# Patient Record
Sex: FEMALE | Race: WHITE | Employment: UNEMPLOYED | URBAN - NONMETROPOLITAN AREA
[De-identification: names, ages, dates, MRNs, and addresses within clinical notes are randomized per-mention and may not be internally consistent; named-entity substitution may affect disease eponyms.]

---

## 2017-04-05 ENCOUNTER — HOSPITAL ENCOUNTER (OUTPATIENT)
Age: 37
Discharge: HOME | End: 2017-04-05
Payer: COMMERCIAL

## 2017-04-05 DIAGNOSIS — M79.10 MYALGIA: ICD-10-CM

## 2017-04-05 DIAGNOSIS — F32.89 OTHER DEPRESSION: ICD-10-CM

## 2017-04-05 DIAGNOSIS — R53.83 OTHER FATIGUE: ICD-10-CM

## 2017-04-05 LAB
ABSOLUTE EOS #: 0 K/UL (ref 0–0.4)
ABSOLUTE LYMPH #: 1.27 K/UL (ref 1–4.8)
ABSOLUTE MONO #: 0.91 K/UL (ref 0–1)
ALBUMIN SERPL-MCNC: 4 G/DL (ref 3.5–5.2)
ALBUMIN/GLOBULIN RATIO: 1.1 (ref 1–2.5)
ALP BLD-CCNC: 202 U/L (ref 35–104)
ALT SERPL-CCNC: 139 U/L (ref 5–33)
ANION GAP SERPL CALCULATED.3IONS-SCNC: 15 MMOL/L (ref 9–17)
AST SERPL-CCNC: 83 U/L
BASOPHILS # BLD: 0 % (ref 0–2)
BASOPHILS ABSOLUTE: 0 K/UL (ref 0–0.2)
BILIRUB SERPL-MCNC: 0.34 MG/DL (ref 0.3–1.2)
BUN BLDV-MCNC: 11 MG/DL (ref 6–20)
BUN/CREAT BLD: 21 (ref 9–20)
CALCIUM SERPL-MCNC: 9.5 MG/DL (ref 8.6–10.4)
CHLORIDE BLD-SCNC: 97 MMOL/L (ref 98–107)
CO2: 26 MMOL/L (ref 20–31)
CREAT SERPL-MCNC: 0.53 MG/DL (ref 0.5–0.9)
DIFFERENTIAL TYPE: ABNORMAL
EOSINOPHILS RELATIVE PERCENT: 0 % (ref 0–8)
GFR AFRICAN AMERICAN: >60 ML/MIN
GFR NON-AFRICAN AMERICAN: >60 ML/MIN
GFR SERPL CREATININE-BSD FRML MDRD: ABNORMAL ML/MIN/{1.73_M2}
GFR SERPL CREATININE-BSD FRML MDRD: ABNORMAL ML/MIN/{1.73_M2}
GLUCOSE BLD-MCNC: 108 MG/DL (ref 70–99)
HCT VFR BLD CALC: 39.4 % (ref 36–46)
HEMOGLOBIN: 13.1 G/DL (ref 12–16)
LYMPHOCYTES # BLD: 7 % (ref 24–44)
MCH RBC QN AUTO: 30.7 PG (ref 26–34)
MCHC RBC AUTO-ENTMCNC: 33.4 G/DL (ref 31–37)
MCV RBC AUTO: 91.9 FL (ref 80–100)
MONOCYTES # BLD: 5 % (ref 0–12)
MORPHOLOGY: NORMAL
MYOGLOBIN: <21 NG/ML (ref 25–58)
PDW BLD-RTO: 13.7 % (ref 12.1–15.2)
PLATELET # BLD: 436 K/UL (ref 140–450)
PLATELET ESTIMATE: ABNORMAL
PMV BLD AUTO: ABNORMAL FL (ref 6–12)
POTASSIUM SERPL-SCNC: 3.7 MMOL/L (ref 3.7–5.3)
RBC # BLD: 4.29 M/UL (ref 4–5.2)
RBC # BLD: ABNORMAL 10*6/UL
SEDIMENTATION RATE, ERYTHROCYTE: 59 MM (ref 0–20)
SEG NEUTROPHILS: 88 % (ref 36–66)
SEGMENTED NEUTROPHILS ABSOLUTE COUNT: 16.02 K/UL (ref 1.8–7.7)
SODIUM BLD-SCNC: 138 MMOL/L (ref 135–144)
TOTAL CK: 65 U/L (ref 26–192)
TOTAL PROTEIN: 7.7 G/DL (ref 6.4–8.3)
TSH SERPL DL<=0.05 MIU/L-ACNC: 1.79 MIU/L (ref 0.3–5)
WBC # BLD: 18.2 K/UL (ref 3.5–11)
WBC # BLD: ABNORMAL 10*3/UL

## 2017-04-05 PROCEDURE — 84443 ASSAY THYROID STIM HORMONE: CPT

## 2017-04-05 PROCEDURE — 85651 RBC SED RATE NONAUTOMATED: CPT

## 2017-04-05 PROCEDURE — 80053 COMPREHEN METABOLIC PANEL: CPT

## 2017-04-05 PROCEDURE — 85025 COMPLETE CBC W/AUTO DIFF WBC: CPT

## 2017-04-05 PROCEDURE — 82550 ASSAY OF CK (CPK): CPT

## 2017-04-05 PROCEDURE — 36415 COLL VENOUS BLD VENIPUNCTURE: CPT

## 2017-04-05 PROCEDURE — 83874 ASSAY OF MYOGLOBIN: CPT

## 2017-04-07 ENCOUNTER — HOSPITAL ENCOUNTER (OUTPATIENT)
Age: 37
Discharge: HOME | End: 2017-04-07
Payer: COMMERCIAL

## 2017-04-07 DIAGNOSIS — R79.89 LIVER FUNCTION TEST ABNORMALITY: ICD-10-CM

## 2017-04-07 LAB
ABSOLUTE EOS #: 0.1 K/UL (ref 0–0.4)
ABSOLUTE LYMPH #: 1.8 K/UL (ref 1–4.8)
ABSOLUTE MONO #: 0.9 K/UL (ref 0–1)
ALBUMIN SERPL-MCNC: 3.9 G/DL (ref 3.5–5.2)
ALBUMIN/GLOBULIN RATIO: 1.1 (ref 1–2.5)
ALP BLD-CCNC: 176 U/L (ref 35–104)
ALT SERPL-CCNC: 137 U/L (ref 5–33)
ANION GAP SERPL CALCULATED.3IONS-SCNC: 12 MMOL/L (ref 9–17)
AST SERPL-CCNC: 71 U/L
BASOPHILS # BLD: 0 % (ref 0–2)
BASOPHILS ABSOLUTE: 0 K/UL (ref 0–0.2)
BILIRUB SERPL-MCNC: 0.22 MG/DL (ref 0.3–1.2)
BUN BLDV-MCNC: 10 MG/DL (ref 6–20)
BUN/CREAT BLD: 15 (ref 9–20)
CALCIUM SERPL-MCNC: 9.2 MG/DL (ref 8.6–10.4)
CHLORIDE BLD-SCNC: 102 MMOL/L (ref 98–107)
CO2: 28 MMOL/L (ref 20–31)
CREAT SERPL-MCNC: 0.68 MG/DL (ref 0.5–0.9)
DIFFERENTIAL TYPE: ABNORMAL
EOSINOPHILS RELATIVE PERCENT: 2 % (ref 0–8)
GFR AFRICAN AMERICAN: >60 ML/MIN
GFR NON-AFRICAN AMERICAN: >60 ML/MIN
GFR SERPL CREATININE-BSD FRML MDRD: ABNORMAL ML/MIN/{1.73_M2}
GFR SERPL CREATININE-BSD FRML MDRD: ABNORMAL ML/MIN/{1.73_M2}
GLUCOSE BLD-MCNC: 110 MG/DL (ref 70–99)
HCT VFR BLD CALC: 39.2 % (ref 36–46)
HEMOGLOBIN: 12.9 G/DL (ref 12–16)
LYMPHOCYTES # BLD: 20 % (ref 24–44)
MCH RBC QN AUTO: 30.3 PG (ref 26–34)
MCHC RBC AUTO-ENTMCNC: 32.9 G/DL (ref 31–37)
MCV RBC AUTO: 91.9 FL (ref 80–100)
MONOCYTES # BLD: 10 % (ref 0–12)
PDW BLD-RTO: 13.5 % (ref 12.1–15.2)
PLATELET # BLD: 422 K/UL (ref 140–450)
PLATELET ESTIMATE: ABNORMAL
PMV BLD AUTO: ABNORMAL FL (ref 6–12)
POTASSIUM SERPL-SCNC: 3.6 MMOL/L (ref 3.7–5.3)
RBC # BLD: 4.26 M/UL (ref 4–5.2)
RBC # BLD: ABNORMAL 10*6/UL
SEG NEUTROPHILS: 68 % (ref 36–66)
SEGMENTED NEUTROPHILS ABSOLUTE COUNT: 6 K/UL (ref 1.8–7.7)
SODIUM BLD-SCNC: 142 MMOL/L (ref 135–144)
TOTAL PROTEIN: 7.5 G/DL (ref 6.4–8.3)
WBC # BLD: 8.9 K/UL (ref 3.5–11)
WBC # BLD: ABNORMAL 10*3/UL

## 2017-04-07 PROCEDURE — 36415 COLL VENOUS BLD VENIPUNCTURE: CPT

## 2017-04-07 PROCEDURE — 85025 COMPLETE CBC W/AUTO DIFF WBC: CPT

## 2017-04-07 PROCEDURE — 80053 COMPREHEN METABOLIC PANEL: CPT

## 2017-06-30 PROBLEM — E66.01 MORBID OBESITY (HCC): Status: ACTIVE | Noted: 2017-06-30

## 2017-08-08 ENCOUNTER — HOSPITAL ENCOUNTER (OUTPATIENT)
Dept: GENERAL RADIOLOGY | Age: 37
Discharge: HOME | End: 2017-08-08
Payer: COMMERCIAL

## 2017-08-08 ENCOUNTER — HOSPITAL ENCOUNTER (OUTPATIENT)
Age: 37
Discharge: HOME | End: 2017-08-08
Payer: COMMERCIAL

## 2017-08-08 DIAGNOSIS — R74.8 ABNORMAL LIVER ENZYMES: ICD-10-CM

## 2017-08-08 DIAGNOSIS — Z20.1 EXPOSURE TO TB: ICD-10-CM

## 2017-08-08 LAB
ALBUMIN SERPL-MCNC: 4.1 G/DL (ref 3.5–5.2)
ALBUMIN/GLOBULIN RATIO: 1.2 (ref 1–2.5)
ALP BLD-CCNC: 108 U/L (ref 35–104)
ALT SERPL-CCNC: 39 U/L (ref 5–33)
ANION GAP SERPL CALCULATED.3IONS-SCNC: 10 MMOL/L (ref 9–17)
AST SERPL-CCNC: 29 U/L
BILIRUB SERPL-MCNC: 0.26 MG/DL (ref 0.3–1.2)
BUN BLDV-MCNC: 9 MG/DL (ref 6–20)
BUN/CREAT BLD: 13 (ref 9–20)
CALCIUM SERPL-MCNC: 9 MG/DL (ref 8.6–10.4)
CHLORIDE BLD-SCNC: 101 MMOL/L (ref 98–107)
CO2: 27 MMOL/L (ref 20–31)
CREAT SERPL-MCNC: 0.67 MG/DL (ref 0.5–0.9)
GFR AFRICAN AMERICAN: >60 ML/MIN
GFR NON-AFRICAN AMERICAN: >60 ML/MIN
GFR SERPL CREATININE-BSD FRML MDRD: ABNORMAL ML/MIN/{1.73_M2}
GFR SERPL CREATININE-BSD FRML MDRD: ABNORMAL ML/MIN/{1.73_M2}
GLUCOSE BLD-MCNC: 106 MG/DL (ref 70–99)
POTASSIUM SERPL-SCNC: 3.7 MMOL/L (ref 3.7–5.3)
SODIUM BLD-SCNC: 138 MMOL/L (ref 135–144)
TOTAL PROTEIN: 7.4 G/DL (ref 6.4–8.3)

## 2017-08-08 PROCEDURE — 80053 COMPREHEN METABOLIC PANEL: CPT

## 2017-08-08 PROCEDURE — 36415 COLL VENOUS BLD VENIPUNCTURE: CPT

## 2017-08-08 PROCEDURE — 71020 XR CHEST STANDARD TWO VW: CPT

## 2017-08-30 ENCOUNTER — HOSPITAL ENCOUNTER (OUTPATIENT)
Age: 37
Discharge: HOME | End: 2017-08-30
Payer: COMMERCIAL

## 2017-08-30 ENCOUNTER — HOSPITAL ENCOUNTER (OUTPATIENT)
Dept: GENERAL RADIOLOGY | Age: 37
Discharge: HOME | End: 2017-08-30
Payer: COMMERCIAL

## 2017-08-30 DIAGNOSIS — S69.91XA WRIST INJURY, RIGHT, INITIAL ENCOUNTER: ICD-10-CM

## 2017-08-30 PROCEDURE — 73110 X-RAY EXAM OF WRIST: CPT

## 2017-09-21 ENCOUNTER — HOSPITAL ENCOUNTER (OUTPATIENT)
Age: 37
Discharge: HOME | End: 2017-09-21
Payer: COMMERCIAL

## 2017-09-21 DIAGNOSIS — Z20.1 EXPOSURE TO TB: ICD-10-CM

## 2017-09-21 LAB
ALT SERPL-CCNC: 29 U/L (ref 5–33)
AST SERPL-CCNC: 25 U/L

## 2017-09-21 PROCEDURE — 36415 COLL VENOUS BLD VENIPUNCTURE: CPT

## 2017-09-21 PROCEDURE — 84460 ALANINE AMINO (ALT) (SGPT): CPT

## 2017-09-21 PROCEDURE — 84450 TRANSFERASE (AST) (SGOT): CPT

## 2018-03-29 PROBLEM — F41.1 GAD (GENERALIZED ANXIETY DISORDER): Status: ACTIVE | Noted: 2018-03-29

## 2018-03-29 PROBLEM — F33.40 DEPRESSION, MAJOR, RECURRENT, IN REMISSION (HCC): Status: ACTIVE | Noted: 2018-03-29

## 2018-07-23 RX ORDER — LEVONORGESTREL AND ETHINYL ESTRADIOL AND ETHINYL ESTRADIOL 150-30(84)
KIT ORAL
Qty: 91 TABLET | Refills: 3 | Status: SHIPPED | OUTPATIENT
Start: 2018-07-23 | End: 2018-09-10 | Stop reason: SDUPTHER

## 2018-09-10 RX ORDER — LEVONORGESTREL / ETHINYL ESTRADIOL AND ETHINYL ESTRADIOL 150-30(84)
1 KIT ORAL DAILY
Qty: 91 TABLET | Refills: 1 | Status: SHIPPED | OUTPATIENT
Start: 2018-09-10 | End: 2019-08-29 | Stop reason: SDUPTHER

## 2018-11-16 PROBLEM — R51.9 HEADACHE, UNSPECIFIED HEADACHE TYPE: Status: ACTIVE | Noted: 2018-11-16

## 2019-02-15 PROBLEM — G47.69 NOCTURNAL MYOCLONUS: Status: ACTIVE | Noted: 2019-02-15

## 2019-06-19 ENCOUNTER — OFFICE VISIT (OUTPATIENT)
Dept: OBGYN | Age: 39
End: 2019-06-19
Payer: COMMERCIAL

## 2019-06-19 ENCOUNTER — HOSPITAL ENCOUNTER (OUTPATIENT)
Age: 39
Setting detail: SPECIMEN
Discharge: HOME | End: 2019-06-19
Payer: COMMERCIAL

## 2019-06-19 VITALS
BODY MASS INDEX: 38.61 KG/M2 | DIASTOLIC BLOOD PRESSURE: 80 MMHG | WEIGHT: 246 LBS | HEIGHT: 67 IN | SYSTOLIC BLOOD PRESSURE: 126 MMHG

## 2019-06-19 DIAGNOSIS — Z01.419 ENCOUNTER FOR ANNUAL ROUTINE GYNECOLOGICAL EXAMINATION: ICD-10-CM

## 2019-06-19 DIAGNOSIS — R63.5 WEIGHT GAIN: ICD-10-CM

## 2019-06-19 DIAGNOSIS — Z01.419 ENCOUNTER FOR ANNUAL ROUTINE GYNECOLOGICAL EXAMINATION: Primary | ICD-10-CM

## 2019-06-19 DIAGNOSIS — N92.6 IRREGULAR MENSES: ICD-10-CM

## 2019-06-19 DIAGNOSIS — F43.21 ADJUSTMENT DISORDER WITH DEPRESSED MOOD: ICD-10-CM

## 2019-06-19 PROCEDURE — 99396 PREV VISIT EST AGE 40-64: CPT | Performed by: ADVANCED PRACTICE MIDWIFE

## 2019-06-19 PROCEDURE — G0145 SCR C/V CYTO,THINLAYER,RESCR: HCPCS

## 2019-06-19 RX ORDER — MEDROXYPROGESTERONE ACETATE 5 MG/1
5 TABLET ORAL DAILY
Qty: 5 TABLET | Refills: 0 | Status: SHIPPED | OUTPATIENT
Start: 2019-06-19 | End: 2019-08-29

## 2019-06-19 RX ORDER — LEVONORGESTREL / ETHINYL ESTRADIOL AND ETHINYL ESTRADIOL 150-30(84)
1 KIT ORAL DAILY
Qty: 91 TABLET | Refills: 3 | Status: SHIPPED | OUTPATIENT
Start: 2019-06-19 | End: 2020-08-03

## 2019-06-19 ASSESSMENT — PATIENT HEALTH QUESTIONNAIRE - PHQ9
2. FEELING DOWN, DEPRESSED OR HOPELESS: 1
SUM OF ALL RESPONSES TO PHQ QUESTIONS 1-9: 2
SUM OF ALL RESPONSES TO PHQ QUESTIONS 1-9: 2
SUM OF ALL RESPONSES TO PHQ9 QUESTIONS 1 & 2: 2
1. LITTLE INTEREST OR PLEASURE IN DOING THINGS: 1

## 2019-06-19 NOTE — PROGRESS NOTES
YEARLY PHYSICAL    Date of service: 2019    Luke Jorge  Is a 35 y.o.   female    PT's PCP is: Lissett Merchant MD     : 1980                                             Subjective:       Patient's last menstrual period was 2019 (approximate). Are your menses regular: no    OB History    Para Term  AB Living   3 3 2 1   3   SAB TAB Ectopic Molar Multiple Live Births             1      # Outcome Date GA Lbr Conrado/2nd Weight Sex Delivery Anes PTL Lv   3 Term 02/27/15 40w0d   F Vag-Spont   VINOD   2 Term  40w0d  8 lb 13 oz (3.997 kg) F Vag-Spont      1   34w0d  4 lb (1.814 kg) F Vag-Spont         Birth Comments: cerclage        Social History     Tobacco Use   Smoking Status Never Smoker   Smokeless Tobacco Never Used        Social History     Substance and Sexual Activity   Alcohol Use Yes    Comment: socially       Family History   Problem Relation Age of Onset    Diabetes Father     Other Mother         dvt, not hereditary     Cancer Mother         carcinoid    Other Maternal Grandmother         aortic & abdominal aneurysms    Breast Cancer Maternal Grandmother     Cancer Paternal Grandmother         brain    Cancer Paternal Grandfather         leukemia       Any family history of breast or ovarian cancer:  Yes    Any family history of blood clots: Yes      Allergies: Prochlorperazine edisylate      Current Outpatient Medications:     medroxyPROGESTERone (PROVERA) 5 MG tablet, Take 1 tablet by mouth daily, Disp: 5 tablet, Rfl: 0    Levonorgest-Eth Estrad -Day 0.15-0.03 &0.01 MG TABS, Take 1 tablet by mouth daily, Disp: 91 tablet, Rfl: 3    citalopram (CELEXA) 20 MG tablet, TAKE ONE TABLET BY MOUTH ONCE DAILY, Disp: 90 tablet, Rfl: 0    DULoxetine (CYMBALTA) 60 MG extended release capsule, Take 1 capsule by mouth daily, Disp: 3 capsule, Rfl: 0    DULoxetine (CYMBALTA) 60 MG considering return to cycle control meds; c/o \"cant lose weight\"    Review of Systems   Constitutional: Negative. HENT: Negative for congestion and sore throat. Respiratory: Negative for shortness of breath. Cardiovascular: Negative for chest pain. Gastrointestinal: Negative for abdominal pain, constipation, diarrhea and nausea. Genitourinary: Negative for dysuria. Musculoskeletal: Negative for back pain. Skin: Negative for rash. Neurological: Negative for headaches. Psychiatric/Behavioral: The patient is not nervous/anxious. Objective  Lymphatic:   no lymphadenopathy  Heent:   negative   Cor: regular rate and rhythm, no murmurs              Pul:clear to auscultation bilaterally- no wheezes, rales or rhonchi, normal air movement, no respiratory distress      GI: Abdomen soft, non-tender. BS normal. No masses,  No organomegaly           Extremities: normal strength, tone, and muscle mass   Breasts: Breast:normal appearance, no masses or tenderness   Pelvic Exam: GENITAL/URINARY:  External Genitalia:  General appearance; normal, Hair distribution; normal, Lesions absent  Urethral Meatus:  Size normal, Location normal, Lesions absent, Prolapse absent  Urethra: Fullness absent, Masses absent  Bladder:  Fullness absent, Masses absent, Tenderness absent, Cystocele absent  Vagina:  General appearance normal, Estrogen effect normal, Discharge absent, Lesions absent, Pelvic support normal  Cervix:  General appearance normal, Lesions absent, Discharge absent, Tenderness absent, Enlargement absent, Nodularity absent  Uterus:  Size normal, Tenderness absent  Adenexa: Masses absent, Tenderness absent  Anus/Perineum:  Lesions absent and Masses absent                                                              Assessment and Plan          Diagnosis Orders   1. Encounter for annual routine gynecological examination  PAP SMEAR    DEBI DIGITAL SCREEN W CAD BILATERAL   2.  Irregular menses  Follicle Stimulating Hormone    Luteinizing Hormone    Prolactin    TSH without Reflex    Testosterone, Free    Insulin, Total    Glucose, Fasting    medroxyPROGESTERone (PROVERA) 5 MG tablet    Levonorgest-Eth Estrad 91-Day 0.15-0.03 &0.01 MG TABS   3. Weight gain     4. Adjustment disorder with depressed mood               I am having Angélica Landis start on medroxyPROGESTERone and Levonorgest-Eth Estrad 91-Day. I am also having her maintain her Levonorgest-Eth Estrad 91-Day, ALPRAZolam, DULoxetine, citalopram, and DULoxetine. Return in about 1 year (around 6/19/2020) for yearly. She was also counseled on her preventative health maintenance recommendations and follow-up. There are no Patient Instructions on file for this visit.     Bryanna Chopra,6/20/2019 3:06 AM

## 2019-06-20 ASSESSMENT — ENCOUNTER SYMPTOMS
ABDOMINAL PAIN: 0
NAUSEA: 0
CONSTIPATION: 0
SORE THROAT: 0
DIARRHEA: 0
BACK PAIN: 0
SHORTNESS OF BREATH: 0

## 2019-06-21 ENCOUNTER — HOSPITAL ENCOUNTER (OUTPATIENT)
Age: 39
Discharge: HOME | End: 2019-06-21
Payer: COMMERCIAL

## 2019-06-21 DIAGNOSIS — N92.6 IRREGULAR MENSES: ICD-10-CM

## 2019-06-21 LAB
FOLLICLE STIMULATING HORMONE: 3 U/L (ref 1.7–21.5)
GLUCOSE FASTING: 95 MG/DL (ref 70–99)
INSULIN COMMENT: NORMAL
INSULIN REFERENCE RANGE:: NORMAL
INSULIN: 6.8 MU/L
LH: 3.1 U/L (ref 1–95.6)
PROLACTIN: 14.53 UG/L (ref 4.79–23.3)
SEX HORMONE BINDING GLOBULIN: 101 NMOL/L (ref 30–135)
TESTOSTERONE FREE-NONMALE: 3.9 PG/ML (ref 1.3–9.2)
TESTOSTERONE TOTAL: 48 NG/DL (ref 20–70)
TSH SERPL DL<=0.05 MIU/L-ACNC: 1.5 MIU/L (ref 0.3–5)

## 2019-06-21 PROCEDURE — 84270 ASSAY OF SEX HORMONE GLOBUL: CPT

## 2019-06-21 PROCEDURE — 84403 ASSAY OF TOTAL TESTOSTERONE: CPT

## 2019-06-21 PROCEDURE — 36415 COLL VENOUS BLD VENIPUNCTURE: CPT

## 2019-06-21 PROCEDURE — 82947 ASSAY GLUCOSE BLOOD QUANT: CPT

## 2019-06-21 PROCEDURE — 83525 ASSAY OF INSULIN: CPT

## 2019-06-21 PROCEDURE — 83001 ASSAY OF GONADOTROPIN (FSH): CPT

## 2019-06-21 PROCEDURE — 84443 ASSAY THYROID STIM HORMONE: CPT

## 2019-06-21 PROCEDURE — 83002 ASSAY OF GONADOTROPIN (LH): CPT

## 2019-06-21 PROCEDURE — 84146 ASSAY OF PROLACTIN: CPT

## 2019-06-24 LAB — CYTOLOGY REPORT: NORMAL

## 2019-06-26 DIAGNOSIS — E66.9 OBESITY (BMI 30-39.9): Primary | ICD-10-CM

## 2019-07-08 ENCOUNTER — TELEPHONE (OUTPATIENT)
Dept: OBGYN | Age: 39
End: 2019-07-08

## 2019-08-27 ENCOUNTER — OFFICE VISIT (OUTPATIENT)
Dept: OBGYN | Age: 39
End: 2019-08-27
Payer: COMMERCIAL

## 2019-08-27 VITALS
HEIGHT: 67 IN | WEIGHT: 241.4 LBS | DIASTOLIC BLOOD PRESSURE: 74 MMHG | BODY MASS INDEX: 37.89 KG/M2 | SYSTOLIC BLOOD PRESSURE: 124 MMHG

## 2019-08-27 DIAGNOSIS — E66.9 OBESITY (BMI 30-39.9): Primary | ICD-10-CM

## 2019-08-27 PROCEDURE — 99213 OFFICE O/P EST LOW 20 MIN: CPT | Performed by: ADVANCED PRACTICE MIDWIFE

## 2019-08-27 NOTE — PROGRESS NOTES
PROBLEM VISIT     Date of service: 2019    Juan Peres  Is a 35 y.o.  female    PT's PCP is: Salvador Crews MD     : 1980                                             Subjective:       Patient's last menstrual period was 2019 (approximate). OB History    Para Term  AB Living   3 3 2 1   3   SAB TAB Ectopic Molar Multiple Live Births             1      # Outcome Date GA Lbr Conrado/2nd Weight Sex Delivery Anes PTL Lv   3 Term 02/27/15 40w0d   F Vag-Spont   VINOD   2 Term  40w0d  8 lb 13 oz (3.997 kg) F Vag-Spont      1   34w0d  4 lb (1.814 kg) F Vag-Spont         Birth Comments: cerclage        Social History     Tobacco Use   Smoking Status Never Smoker   Smokeless Tobacco Never Used        Social History     Substance and Sexual Activity   Alcohol Use Yes    Comment: socially       Social History     Substance and Sexual Activity   Sexual Activity Yes    Partners: Male    Birth control/protection: Pill       Allergies: Prochlorperazine edisylate    Chief Complaint   Patient presents with    Obesity     Medication/ weight check, patient currently using Saxenda. has adjusted dose up and then down . Was doing very well then had extreme hunger and was adviced by [place]  to decrease dose and then work up again. Previous weight 246 lbs. on 2019. Last Yearly:  2019    Last pap: 2019    Last HPV: na    PE:  Vital Signs  Blood pressure 124/74, height 5' 6.5\" (1.689 m), weight 241 lb 6.4 oz (109.5 kg), last menstrual period 2019, not currently breastfeeding. NURSE: Krystal ESTRADA    HPI: here for f/u weight management with Saxenda. Has continued diet and exercise; lost 5 lbs. With assistance of saxenda  did not tolerate higher doses so extended extra weeks of lower doses now has one dose of 1.8 mg left in last pen and is on 1.8 mg QD.   Desires continuation      PT denies fever, chills, nausea and vomiting       Objective: No acute distress  Excellent communications  Well-nourished    Results reviewed today:    No results found for this visit on 08/27/19. Assessment and Plan          Diagnosis Orders   1. Obesity (BMI 30-39.9)  liraglutide-weight management 18 MG/3ML SOPN       discussed case with Dr. Jose Zhu, will refill Saxenda but if unable to reach 3 mg a day does not meet continuation criteria. I am having Joey Landis start on liraglutide-weight management. I am also having her maintain her Levonorgest-Eth Estrad 91-Day, ALPRAZolam, and Liraglutide -Weight Management (SAXENDA SC). Return in about 1 month (around 9/27/2019) for med check. There are no Patient Instructions on file for this visit. Over 50% of time spent on counseling and care coordination on: see assessment and plan,  She was also counseled on her preventative health maintenance recommendations and follow-up.         FF time: 15 min      Todd Chopra,8/31/2019 1:15 PM

## 2019-10-03 ENCOUNTER — OFFICE VISIT (OUTPATIENT)
Dept: PULMONOLOGY | Age: 39
End: 2019-10-03
Payer: COMMERCIAL

## 2019-10-03 VITALS
HEIGHT: 67 IN | HEART RATE: 96 BPM | DIASTOLIC BLOOD PRESSURE: 75 MMHG | BODY MASS INDEX: 36.41 KG/M2 | TEMPERATURE: 99.4 F | RESPIRATION RATE: 16 BRPM | SYSTOLIC BLOOD PRESSURE: 129 MMHG | WEIGHT: 232 LBS

## 2019-10-03 DIAGNOSIS — G47.10 HYPERSOMNIA WITH SLEEP APNEA: ICD-10-CM

## 2019-10-03 DIAGNOSIS — F51.4 ADULT NIGHT TERRORS: ICD-10-CM

## 2019-10-03 DIAGNOSIS — E66.9 OBESITY (BMI 35.0-39.9 WITHOUT COMORBIDITY): ICD-10-CM

## 2019-10-03 DIAGNOSIS — G47.30 HYPERSOMNIA WITH SLEEP APNEA: ICD-10-CM

## 2019-10-03 DIAGNOSIS — G47.50 PARASOMNIA, UNSPECIFIED TYPE: Primary | ICD-10-CM

## 2019-10-03 DIAGNOSIS — G47.33 OBSTRUCTIVE SLEEP APNEA: ICD-10-CM

## 2019-10-03 DIAGNOSIS — G47.69 NOCTURNAL MYOCLONUS: ICD-10-CM

## 2019-10-03 PROCEDURE — 99204 OFFICE O/P NEW MOD 45 MIN: CPT | Performed by: INTERNAL MEDICINE

## 2019-11-11 ENCOUNTER — HOSPITAL ENCOUNTER (OUTPATIENT)
Dept: SLEEP CENTER | Age: 39
Discharge: HOME | End: 2019-11-11
Payer: COMMERCIAL

## 2019-11-11 DIAGNOSIS — G47.50 PARASOMNIA, UNSPECIFIED TYPE: ICD-10-CM

## 2019-11-11 DIAGNOSIS — G47.69 NOCTURNAL MYOCLONUS: ICD-10-CM

## 2019-11-11 DIAGNOSIS — G47.33 OBSTRUCTIVE SLEEP APNEA: ICD-10-CM

## 2019-11-11 PROCEDURE — 95810 POLYSOM 6/> YRS 4/> PARAM: CPT

## 2019-12-02 LAB — STATUS: NORMAL

## 2019-12-08 ENCOUNTER — HOSPITAL ENCOUNTER (OUTPATIENT)
Dept: SLEEP CENTER | Age: 39
Discharge: HOME | End: 2019-12-08
Payer: COMMERCIAL

## 2019-12-08 DIAGNOSIS — G47.50 PARASOMNIA, UNSPECIFIED TYPE: ICD-10-CM

## 2019-12-08 DIAGNOSIS — G47.33 OBSTRUCTIVE SLEEP APNEA: ICD-10-CM

## 2019-12-08 PROCEDURE — 95811 POLYSOM 6/>YRS CPAP 4/> PARM: CPT

## 2020-02-13 ENCOUNTER — OFFICE VISIT (OUTPATIENT)
Dept: PULMONOLOGY | Age: 40
End: 2020-02-13
Payer: COMMERCIAL

## 2020-02-13 VITALS
WEIGHT: 255 LBS | TEMPERATURE: 98.3 F | SYSTOLIC BLOOD PRESSURE: 118 MMHG | RESPIRATION RATE: 16 BRPM | HEIGHT: 67 IN | BODY MASS INDEX: 40.02 KG/M2 | DIASTOLIC BLOOD PRESSURE: 78 MMHG | HEART RATE: 84 BPM | OXYGEN SATURATION: 98 %

## 2020-02-13 PROCEDURE — 99214 OFFICE O/P EST MOD 30 MIN: CPT | Performed by: INTERNAL MEDICINE

## 2020-02-13 NOTE — PROGRESS NOTES
OUTPATIENT PULMONARY PROGRESS NOTE      Patient:  Taqueria Morales  MRN: [id number]    Consulting Physician: Dr. Prateek Gary  Reason for Consult: Obstructive sleep apnea/parasomnia  Primacy Care Physician: Dary Lorenzo MD    HISTORY OF PRESENT ILLNESS:   The patient is a 35 y.o. female   She was initially referred here for evaluation of hypersomnia/obstructive sleep apnea/parasomnia. She was seen for the first time last time about 3 months ago apparently she was supposed to come back in 6 weeks but apparently sleep study was not done and she is for follow-up today. According to patient she continued to have the same symptoms as before initially she was taking Klonopin 0.5 mg at night and for first few night symptoms were controlled and sleep was better  she started having symptoms again she was prescribed Klonopin 1 mg and according to patient it did not work and she stopped using Klonopin and currently not using it. She claimed she is still having nightmares while in behavior during sleep, screaming in sleep sometimes punching and kicking and acting out some time occasional falling out of the bed but no injury she claimed most of those episodes happen during early part of the night, no sleep paralysis or hallucination. She had a sleep study done on 11/11/2020 she had 26% stage I, 58% stage II 16% stage III and no REM sleep was recorded WASO was 162 minutes, AHI was 11.2 and she was diagnosed with mild obstructive sleep apnea, sleep efficiency was reduced, she had PLM index of 13/h but PLM arousal index of 1.3. She was started on CPAP of 12 cm and 30 days compliance data is available from 01/03/2020 02/01/2020 and average usage is 8 hours 29 minutes with AHI of 2.0 with good compliance.   Since she started on CPAP she claimed she did not feel a difference in her symptoms and she still have the same symptoms as mentioned above, she feels tired during the daytime, she feels sleepy feels like that she can go to tired she feels like that even after she had 8 hours of sleep she did not get enough sleep difficulty waking up in the morning, she has daytime fatigue tiredness sleepiness if circumstances permit she will doze off, naps if allowed snoring is very loud and possible witnessed apnea, she has weight gain considerable amount and last 5 years. He apparently was depressed about her sleep and concern about above condition and she had been on Cymbalta and Celexa. Sleep questionnaire  No dry mouth upon awakening. Positive fatigue and tiredness during the day. Goes to sleep at 8-9 pm, wakes up 630 am. It takes 30 minutes to fall asleep. Wakes up 2-3 times at night to go to bathroom. Takes 1 nap during the day sometime . No headache in am. No car wrecks or near wrecks because of the sleepiness. + nodding off while driving. + weight gain. Positive forgetfulness or decreased concentration . No nasal congestion or obstruction at night. No significant caffienated drinks or alcohol. No leg jerks during sleep. No restless feelings in legs at night. No numbness or burning in leg or feet. No leg aches or cramps . No loss of muscle strength when angry or laugh. No hallucination when dozing off or waking up from sleep. No paralysis upon awakening from sleep or going to sleep. Positive teeth grinding, Positive nightmares, no sleep walking. No night time panic attacks. No flowsheet data found.     ESS: 16  Sitting and reading 2  Watching TV 2  Sitting inactive in a public place (e.g a theater or a meeting) 2  As a passenger in a car for an hour without a break 3  Lying down to rest in the afternoon when circumstances permit 3  Sitting and talking to someone 1  Sitting quietly after a lunch without alcohol 3  In a car, while stopped for a few minutes in traffic 2    Past Medical History:        Diagnosis Date    Abdominal pain     Anxiety     Carpal tunnel syndrome     Depression     Herpes zoster     Low back pain Past Surgical History:        Procedure Laterality Date    ABDOMINOPLASTY  2004    BREAST REDUCTION SURGERY  2003    BREAST REDUCTION SURGERY Bilateral 2003    CHOLECYSTECTOMY, LAPAROSCOPIC      with cholangiograms    HERNIA REPAIR N/A 44/67/8084    UMBILICAL HERNIA REPAIR WITH MESH       Allergies: Allergies   Allergen Reactions    Prochlorperazine Edisylate Anaphylaxis         Home Meds:   Outpatient Encounter Medications as of 2/13/2020   Medication Sig Dispense Refill    DULoxetine (CYMBALTA) 60 MG extended release capsule Take 1 capsule by mouth daily 90 capsule 0    citalopram (CELEXA) 20 MG tablet TAKE ONE TABLET BY MOUTH ONCE DAILY 90 tablet 0    Levonorgest-Eth Estrad 91-Day 0.15-0.03 &0.01 MG TABS Take 1 tablet by mouth daily 91 tablet 3    albuterol sulfate HFA (PROAIR HFA) 108 (90 Base) MCG/ACT inhaler Inhale 2 puffs into the lungs every 6 hours as needed for Wheezing (Patient not taking: Reported on 2/13/2020) 1 Inhaler 3    clonazePAM (KLONOPIN) 1 MG tablet Take 1 tablet by mouth nightly as needed for Anxiety (noght terrors) for up to 30 days. (Patient not taking: Reported on 2/13/2020) 30 tablet 0     No facility-administered encounter medications on file as of 2/13/2020. Social History:   TOBACCO:   reports that she has never smoked. She has never used smokeless tobacco.  ETOH:   reports current alcohol use.   OCCUPATION:  Nursing navigator for physician practices    Family History:       Problem Relation Age of Onset    Diabetes Father     Other Mother         dvt, not hereditary     Cancer Mother         carcinoid    Other Maternal Grandmother         aortic & abdominal aneurysms    Breast Cancer Maternal Grandmother     Cancer Paternal Grandmother         brain    Cancer Paternal Grandfather         leukemia       Immunizations:    Immunization History   Administered Date(s) Administered    DTaP 10/18/2005    Influenza Virus Vaccine 09/27/2019         REVIEW OF SYSTEMS:  CONSTITUTIONAL:  positive for  Fatigue, negative for  fevers, chills, sweats, malaise, anorexia and weight loss  EYES:  negative for  double vision, blurred vision, dry eyes, visual disturbance, irritation, redness and icterus  HEENT:  negative for  hearing loss, tinnitus, nasal congestion, epistaxis, sore throat, hoarseness and voice change  RESPIRATORY:  negative for  dry cough, cough with sputum, dyspnea, wheezing, hemoptysis, chest pain and pleuritic pain  CARDIOVASCULAR:  negative for  chest pain, dyspnea, palpitations, orthopnea, PND, early saiety, edema, syncope  GASTROINTESTINAL:  negative for nausea, vomiting, diarrhea, constipation, abdominal pain, pruritus, abdominal distention, jaundice, dysphagia, reflux, hematemesis and hemtochezia  GENITOURINARY:  negative for frequency, dysuria, urinary incontinence, hesitancy, decreased stream and hematuria  HEMATOLOGIC/LYMPHATIC:  negative for easy bruising, bleeding, lymphadenopathy and petechiae  ALLERGIC/IMMUNOLOGIC:  negative for recurrent infections, urticaria, hay fever, angioedema and anaphylaxis  ENDOCRINE:  negative for heat intolerance, cold intolerance, tremor and change in bowel habits  MUSCULOSKELETAL:  negative for  myalgias, arthralgias, joint swelling, stiff joints, decreased range of motion and muscle weakness  NEUROLOGICAL:  negative for headaches, dizziness, seizures, memory problems, speech problems, visual disturbance, coordination problems, gait problems and tremor  BEHAVIOR/PSYCH:  positive for increased sleep, decreased energy level and fatigue and negative for poor appetite, increased appetite, decreased sleep, increased energy level, depressed mood and increased agitation  snoring, periods of not breathing, excessive daytime sleepiness, falling asleep while reading, watching television, disrupted sleep, naps        Physical Exam:    Vitals: /78 (Site: Right Upper Arm, Position: Sitting, Cuff Size: Large Adult)   Pulse 84 Temp 98.3 °F (36.8 °C) (Temporal)   Resp 16   Ht 5' 7\" (1.702 m)   Wt 255 lb (115.7 kg)   SpO2 98%   BMI 39.94 kg/m²   Last 3 weights: Wt Readings from Last 3 Encounters:   02/13/20 255 lb (115.7 kg)   10/03/19 232 lb (105.2 kg)   08/29/19 241 lb (109.3 kg)     Body mass index is 39.94 kg/m².     Physical Examination:   General appearance - alert, well appearing, and in no distress, overweight and acyanotic, in no respiratory distress  Mental status - alert, oriented to person, place, and time, normal mood, behavior, speech, dress, motor activity, and thought processes  Eyes - pupils equal and reactive, extraocular eye movements intact, sclera anicteric  Ears - right ear normal, left ear normal  Nose - normal and patent, no erythema, discharge or polyps  Mouth - mucous membranes moist, pharynx normal without lesions large tongue, Mp 2-3, small oropharynx  Neck - supple, no significant adenopathy, thick and short neck  Chest - clear to auscultation, no wheezes, rales or rhonchi, symmetric air entry, no tachypnea, retractions or cyanosis  Heart - normal rate, regular rhythm, normal S1, S2, no murmurs, rubs, clicks or gallops  Abdomen - soft, nontender, nondistended, no masses or organomegaly  Neurological - alert, oriented, normal speech, no focal findings or movement disorder noted  Extremities - peripheral pulses normal, no pedal edema, no clubbing or cyanosis  Skin - normal coloration and turgor, no rashes, no suspicious skin lesions noted       LABS:    CBC:   WBC   Date Value Ref Range Status   04/07/2017 8.9 3.5 - 11.0 k/uL Final   04/05/2017 18.2 (H) 3.5 - 11.0 k/uL Final   02/17/2015 14.0 (H) 3.5 - 11.0 k/uL Final     Hemoglobin   Date Value Ref Range Status   04/07/2017 12.9 12.0 - 16.0 g/dL Final   04/05/2017 13.1 12.0 - 16.0 g/dL Final   02/17/2015 12.5 12.0 - 16.0 g/dL Final     Platelets   Date Value Ref Range Status   04/07/2017 422 140 - 450 k/uL Final   04/05/2017 436 140 - 450 k/uL Final dreams and likely night terrors rather than myoclonus. She responded well to Klonopin initially and her symptoms are much controlled of nocturnal terrors and dreams were less with less acting out at night but according to patient it helped for only short period of time and she started having symptoms again Klonopin was increased but she still had symptoms and she stopped using it does not want to use Klonopin. Her sleep study shows mild sleep apnea, during sleep study she did not have any REM she has PLMS but no significant PLMD, she is still complaining of daytime fatigue tiredness and sleepiness despite using CPAP and she is compliant with CPAP for at least 1 month since CPAP was started. She will need M MARLENT with overnight CPAP, to determine sleep latency and pathological somnolence and or sleep onset REM to determine narcolepsy or other hypersomnia. I have discussed with patient and recommend evaluation in the multidisciplinary clinic in [facility] for sleep/neuropsychiatry evaluation to get a diagnosis as mentioned above parasomnia/REM behavior disorder/nocturnal myoclonus. Plan and recommendation:  . Multiple sleep latency test with overnight PSG with CPAP followed by [facility]  Recommend to refer to sleep and neuropsychiatry multidisciplinary sleep clinic in [facility], referral made. She is advised not to drive while she is sleepy. I have discussed with her about adequate sleep duration of 8 hours of sleep. Good sleep hygiene discussed. Also discussed with her about safe sleep environment, less sharp object and tables in her room, fall precautions, padding of corners of bed and tables. Refreshing nap for 30 minutes is advised during the daytime. Advised to continue CPAP every night. Compliance with CPAP discussed continue CPAP with nasal pillows. Continue heated modification maintain an active lifestyle.   Encourage exercise during the daytime and avoid exercise in the evening. Wt loss is recommended and discussed  Follow good sleep hygeine instructions  Given sleep hygeine instructions. Vaccinations recommended   Up to date with vaccinations from pulm perspective   Questions answered pertaining to diagnosis and management explained importance of compliance with therapy     It was my pleasure to evaluate Leontine Borne today. Please call with questions. Please note that this chart was generated using voice recognition Dragon dictation software. Although every effort was made to ensure the accuracy of this automated transcription, some errors in transcription may have occurred. Follow-up in office in 3 months.     Radha Verduzco MD             2/13/2020, 3:04 PM

## 2020-02-13 NOTE — PATIENT INSTRUCTIONS
the pressure of your CPAP. · If your nose is dry, try a humidifier. · If your nose is runny or stuffy, try decongestant medicine or a steroid nasal spray. Be safe with medicines. Read and follow all instructions on the label. Do not use the medicine longer than the label says. If these things do not help, you might try a different type of machine. Some machines have air pressure that adjusts on its own. Others have air pressures that are different when you breathe in than when you breathe out. This may reduce discomfort caused by too much pressure in your nose. Where can you learn more? Go to https://[employer]dy[employer]. org and sign in to your [employer] account. Enter V466 in the [place] box to learn more about \"Learning About CPAP for Sleep Apnea. \"     If you do not have an account, please click on the \"Sign Up Now\" link. Current as of: June 9, 2019  Content Version: 12.3  © 4541-0021 Healthwise, Incorporated. Care instructions adapted under license by [facility] ([facility]). If you have questions about a medical condition or this instruction, always ask your healthcare professional. [place] [address] any warranty or liability for your use of this information.

## 2020-03-12 ENCOUNTER — OFFICE VISIT (OUTPATIENT)
Dept: PRIMARY CARE CLINIC | Age: 40
End: 2020-03-12
Payer: COMMERCIAL

## 2020-03-12 VITALS
HEIGHT: 67 IN | BODY MASS INDEX: 40.65 KG/M2 | SYSTOLIC BLOOD PRESSURE: 132 MMHG | WEIGHT: 259 LBS | DIASTOLIC BLOOD PRESSURE: 82 MMHG | HEART RATE: 96 BPM | TEMPERATURE: 97.5 F

## 2020-03-12 LAB
INFLUENZA A ANTIBODY: NEGATIVE
INFLUENZA B ANTIBODY: NEGATIVE
S PYO AG THROAT QL: NORMAL

## 2020-03-12 PROCEDURE — 99213 OFFICE O/P EST LOW 20 MIN: CPT | Performed by: NURSE PRACTITIONER

## 2020-03-12 PROCEDURE — 87880 STREP A ASSAY W/OPTIC: CPT | Performed by: NURSE PRACTITIONER

## 2020-03-12 PROCEDURE — 87804 INFLUENZA ASSAY W/OPTIC: CPT | Performed by: NURSE PRACTITIONER

## 2020-03-12 RX ORDER — DOXYCYCLINE HYCLATE 100 MG
100 TABLET ORAL 2 TIMES DAILY
Qty: 14 TABLET | Refills: 0 | Status: SHIPPED | OUTPATIENT
Start: 2020-03-12 | End: 2020-03-19

## 2020-03-12 ASSESSMENT — ENCOUNTER SYMPTOMS
SORE THROAT: 1
EYE DISCHARGE: 0
COUGH: 0
RHINORRHEA: 1
NAUSEA: 0
WHEEZING: 0
SHORTNESS OF BREATH: 0
SINUS PAIN: 1
EYE ITCHING: 0
TROUBLE SWALLOWING: 0
SINUS PRESSURE: 1
EYE PAIN: 0
VOMITING: 0
ABDOMINAL DISTENTION: 0
DIARRHEA: 0

## 2020-03-12 NOTE — PATIENT INSTRUCTIONS
SURVEY:    You may be receiving a survey from [employer] regarding your visit today. Please complete the survey to enable us to provide the highest quality of care to you and your family. If you cannot score us a very good on any question, please call the office to discuss how we could of made your experience a very good one. Thank you. Patient Education        Upper Respiratory Infection (Cold): Care Instructions  Your Care Instructions    An upper respiratory infection, or URI, is an infection of the nose, sinuses, or throat. URIs are spread by coughs, sneezes, and direct contact. The common cold is the most frequent kind of URI. The flu and sinus infections are other kinds of URIs. Almost all URIs are caused by viruses. Antibiotics won't cure them. But you can treat most infections with home care. This may include drinking lots of fluids and taking over-the-counter pain medicine. You will probably feel better in 4 to 10 days. The doctor has checked you carefully, but problems can develop later. If you notice any problems or new symptoms, get medical treatment right away. Follow-up care is a key part of your treatment and safety. Be sure to make and go to all appointments, and call your doctor if you are having problems. It's also a good idea to know your test results and keep a list of the medicines you take. How can you care for yourself at home? · To prevent dehydration, drink plenty of fluids, enough so that your urine is light yellow or clear like water. Choose water and other caffeine-free clear liquids until you feel better. If you have kidney, heart, or liver disease and have to limit fluids, talk with your doctor before you increase the amount of fluids you drink. · Take an over-the-counter pain medicine, such as acetaminophen (Tylenol), ibuprofen (Advil, Motrin), or naproxen (Aleve). Read and follow all instructions on the label.   · Before you use cough and cold medicines, check the this information.

## 2020-03-12 NOTE — PROGRESS NOTES
[facility] PHYSICIANS  [facility] PRIMARY CARE [place] [address] [address] [facility] Kofi  Dept: [phone]  Dept Fax: [phone]    Narayan Newman is a 35 y.o. female who presentsto the [facility] in Care today for her medical conditions/complaints as noted below. Narayan Newman is c/o of URI (2 weeks ago sinus infection/finished ATB augmentin/congestion, headache, fatigued, fever low grade, never broke 100*)      HPI:     David Antoine is here today for a walk in visit. She was treated for a sinus infection and finished this yesterday, she was on this for 10 days. She reports when she was on the antibiotic she felt a little better however was still having symptoms along with a low-grade fever of . URI    This is a recurrent problem. The current episode started yesterday. The problem has been unchanged. There has been no fever. Associated symptoms include congestion, headaches, rhinorrhea, sinus pain and a sore throat. Pertinent negatives include no chest pain, coughing, diarrhea, dysuria, ear pain, nausea, neck pain, rash, vomiting or wheezing. Treatments tried: augmentin. The treatment provided mild relief. Past Medical History:   Diagnosis Date    Abdominal pain     Anxiety     Carpal tunnel syndrome     Depression     Herpes zoster     Low back pain         Current Outpatient Medications   Medication Sig Dispense Refill    doxycycline hyclate (VIBRA-TABS) 100 MG tablet Take 1 tablet by mouth 2 times daily for 7 days 14 tablet 0    desvenlafaxine succinate (PRISTIQ) 50 MG TB24 extended release tablet Take 1 tablet by mouth daily 30 tablet 0    citalopram (CELEXA) 20 MG tablet Take 1 tablet by mouth once daily 90 tablet 0    Levonorgest-Eth Estrad 91-Day 0.15-0.03 &0.01 MG TABS Take 1 tablet by mouth daily 91 tablet 3    eszopiclone (LUNESTA) 3 MG TABS Take 1 tablet by mouth nightly for 30 days.  (Patient not taking: Reported on 3/12/2020) 30 tablet 0    albuterol sulfate HFA (PROAIR HFA) 108 (90 Base) MCG/ACT inhaler Inhale 2 puffs into the lungs every 6 hours as needed for Wheezing (Patient not taking: Reported on 2/13/2020) 1 Inhaler 3     No current facility-administered medications for this visit. Allergies   Allergen Reactions    Prochlorperazine Edisylate Anaphylaxis       Subjective:      Review of Systems   Constitutional: Positive for fatigue and fever (tmax 100). Negative for activity change, appetite change and chills. HENT: Positive for congestion, postnasal drip, rhinorrhea, sinus pressure, sinus pain and sore throat. Negative for ear discharge, ear pain, mouth sores and trouble swallowing. Eyes: Negative for pain, discharge and itching. Respiratory: Negative for cough, shortness of breath and wheezing. Cardiovascular: Negative for chest pain and palpitations. Gastrointestinal: Negative for abdominal distention, diarrhea, nausea and vomiting. Genitourinary: Negative for difficulty urinating and dysuria. Musculoskeletal: Negative for neck pain. Skin: Negative for rash. Neurological: Positive for headaches. Negative for dizziness. Objective:     Physical Exam  Vitals signs and nursing note reviewed. Constitutional:       General: She is not in acute distress. Appearance: Normal appearance. She is not toxic-appearing or diaphoretic. HENT:      Head: Normocephalic and atraumatic. Right Ear: A middle ear effusion is present. Tympanic membrane is not erythematous or bulging. Left Ear:  No middle ear effusion. Tympanic membrane is not erythematous or bulging. Nose: Mucosal edema, congestion and rhinorrhea present. Right Turbinates: Swollen. Left Turbinates: Swollen. Right Sinus: Maxillary sinus tenderness and frontal sinus tenderness present. Left Sinus: Maxillary sinus tenderness and frontal sinus tenderness present.       Mouth/Throat:      Mouth: Mucous membranes are moist.      Pharynx: Posterior oropharyngeal erythema present. No oropharyngeal exudate. Eyes:      General:         Right eye: No discharge. Left eye: No discharge. Neck:      Musculoskeletal: Normal range of motion and neck supple. Cardiovascular:      Rate and Rhythm: Normal rate and regular rhythm. Heart sounds: No murmur. Pulmonary:      Effort: Pulmonary effort is normal. No respiratory distress. Breath sounds: Normal breath sounds. No wheezing, rhonchi or rales. Lymphadenopathy:      Cervical: No cervical adenopathy. Neurological:      Mental Status: She is alert. /82 (Site: Left Upper Arm, Position: Sitting, Cuff Size: Large Adult)   Pulse 96   Temp 97.5 °F (36.4 °C) (Temporal)   Ht 5' 7\" (1.702 m)   Wt 259 lb (117.5 kg)   BMI 40.57 kg/m²     Assessment:      Diagnosis Orders   1. Acute recurrent pansinusitis  doxycycline hyclate (VIBRA-TABS) 100 MG tablet   2. Fever, unspecified fever cause  POCT Influenza A/B    POCT rapid strep A     Both strep and influenza negative. We will treat her with recurrent pansinusitis with doxycycline. This will also cover any possible atypicals. Plan:     Exam findings and plan of care discussed at bedside including:    · Start doxycycline-   today; discussed administration and side effects. I have encouraged to take with a probiotic and food to descrease side effects. Examples of probiotics discussed. · Supportive management discussed including: Encouraged to increase fluids and rest, Mucinex/Guaifenesin OTC as directed on package, Nasal saline spray OTC every couple of hours for nasal congestion, Warm facial packs applied to face for 5 to 10 minutes, 3 times per day. Aleve/Ibuprofen/Tylenol OTC PRN for pain, discomfort or fever  · Written instructions provided with AVS.      · To ER or call 911 if any difficulty breathing, shortness of breath, inability to swallow, hives, rash, facial/tongue swelling or temp greater than 103 degrees.   · Follow up as

## 2020-08-03 RX ORDER — LEVONORGESTREL AND ETHINYL ESTRADIOL AND ETHINYL ESTRADIOL 150-30(84)
KIT ORAL
Qty: 91 TABLET | Refills: 0 | Status: SHIPPED | OUTPATIENT
Start: 2020-08-03 | End: 2020-11-12

## 2020-09-15 ENCOUNTER — HOSPITAL ENCOUNTER (OUTPATIENT)
Age: 40
Discharge: HOME | End: 2020-09-15
Payer: COMMERCIAL

## 2020-09-15 LAB
ABSOLUTE EOS #: 0.13 K/UL (ref 0–0.44)
ABSOLUTE IMMATURE GRANULOCYTE: <0.03 K/UL (ref 0–0.3)
ABSOLUTE LYMPH #: 2 K/UL (ref 1.1–3.7)
ABSOLUTE MONO #: 1.02 K/UL (ref 0.1–1.2)
ALBUMIN SERPL-MCNC: 4.2 G/DL (ref 3.5–5.2)
ALBUMIN/GLOBULIN RATIO: 1.4 (ref 1–2.5)
ALP BLD-CCNC: 114 U/L (ref 35–104)
ALT SERPL-CCNC: 75 U/L (ref 5–33)
ANION GAP SERPL CALCULATED.3IONS-SCNC: 10 MMOL/L (ref 9–17)
AST SERPL-CCNC: 44 U/L
BASOPHILS # BLD: 0 % (ref 0–2)
BASOPHILS ABSOLUTE: 0.04 K/UL (ref 0–0.2)
BILIRUB SERPL-MCNC: 0.43 MG/DL (ref 0.3–1.2)
BUN BLDV-MCNC: 19 MG/DL (ref 6–20)
BUN/CREAT BLD: 30 (ref 9–20)
CALCIUM SERPL-MCNC: 9.9 MG/DL (ref 8.6–10.4)
CHLORIDE BLD-SCNC: 103 MMOL/L (ref 98–107)
CO2: 26 MMOL/L (ref 20–31)
CREAT SERPL-MCNC: 0.64 MG/DL (ref 0.5–0.9)
DIFFERENTIAL TYPE: ABNORMAL
EOSINOPHILS RELATIVE PERCENT: 1 % (ref 1–4)
FERRITIN: 139 UG/L (ref 13–150)
FOLATE: 10.8 NG/ML
GFR AFRICAN AMERICAN: >60 ML/MIN
GFR NON-AFRICAN AMERICAN: >60 ML/MIN
GFR SERPL CREATININE-BSD FRML MDRD: ABNORMAL ML/MIN/{1.73_M2}
GFR SERPL CREATININE-BSD FRML MDRD: ABNORMAL ML/MIN/{1.73_M2}
GLUCOSE BLD-MCNC: 93 MG/DL (ref 70–99)
HCT VFR BLD CALC: 41.6 % (ref 36.3–47.1)
HEMOGLOBIN: 13.5 G/DL (ref 11.9–15.1)
IMMATURE GRANULOCYTES: 0 %
IRON SATURATION: 49 % (ref 20–55)
IRON: 138 UG/DL (ref 37–145)
LYMPHOCYTES # BLD: 22 % (ref 24–43)
MAGNESIUM: 2 MG/DL (ref 1.6–2.6)
MCH RBC QN AUTO: 31 PG (ref 25.2–33.5)
MCHC RBC AUTO-ENTMCNC: 32.5 G/DL (ref 28.4–34.8)
MCV RBC AUTO: 95.6 FL (ref 82.6–102.9)
MONOCYTES # BLD: 11 % (ref 3–12)
NRBC AUTOMATED: 0 PER 100 WBC
PDW BLD-RTO: 13.3 % (ref 11.8–14.4)
PHOSPHORUS: 2.1 MG/DL (ref 2.6–4.5)
PLATELET # BLD: 373 K/UL (ref 138–453)
PLATELET ESTIMATE: ABNORMAL
PMV BLD AUTO: 9.3 FL (ref 8.1–13.5)
POTASSIUM SERPL-SCNC: 4.4 MMOL/L (ref 3.7–5.3)
RBC # BLD: 4.35 M/UL (ref 3.95–5.11)
RBC # BLD: ABNORMAL 10*6/UL
SEG NEUTROPHILS: 66 % (ref 36–65)
SEGMENTED NEUTROPHILS ABSOLUTE COUNT: 5.91 K/UL (ref 1.5–8.1)
SODIUM BLD-SCNC: 139 MMOL/L (ref 135–144)
TOTAL IRON BINDING CAPACITY: 283 UG/DL (ref 250–450)
TOTAL PROTEIN: 7.2 G/DL (ref 6.4–8.3)
UNSATURATED IRON BINDING CAPACITY: 145 UG/DL (ref 112–347)
VITAMIN B-12: 466 PG/ML (ref 232–1245)
VITAMIN D 25-HYDROXY: 55.8 NG/ML (ref 30–100)
WBC # BLD: 9.1 K/UL (ref 3.5–11.3)
WBC # BLD: ABNORMAL 10*3/UL

## 2020-09-15 PROCEDURE — 83540 ASSAY OF IRON: CPT

## 2020-09-15 PROCEDURE — 82306 VITAMIN D 25 HYDROXY: CPT

## 2020-09-15 PROCEDURE — 84425 ASSAY OF VITAMIN B-1: CPT

## 2020-09-15 PROCEDURE — 36415 COLL VENOUS BLD VENIPUNCTURE: CPT

## 2020-09-15 PROCEDURE — 83550 IRON BINDING TEST: CPT

## 2020-09-15 PROCEDURE — 80053 COMPREHEN METABOLIC PANEL: CPT

## 2020-09-15 PROCEDURE — 83735 ASSAY OF MAGNESIUM: CPT

## 2020-09-15 PROCEDURE — 82728 ASSAY OF FERRITIN: CPT

## 2020-09-15 PROCEDURE — 82746 ASSAY OF FOLIC ACID SERUM: CPT

## 2020-09-15 PROCEDURE — 84100 ASSAY OF PHOSPHORUS: CPT

## 2020-09-15 PROCEDURE — 82607 VITAMIN B-12: CPT

## 2020-09-15 PROCEDURE — 85025 COMPLETE CBC W/AUTO DIFF WBC: CPT

## 2020-09-19 LAB — VITAMIN B1 WHOLE BLOOD: 136 NMOL/L (ref 70–180)

## 2020-12-15 PROBLEM — E66.9 OBESITY: Status: ACTIVE | Noted: 2020-12-15

## 2020-12-15 PROBLEM — F32.A DEPRESSION: Status: ACTIVE | Noted: 2020-12-15

## 2020-12-15 PROBLEM — G47.33 OSA (OBSTRUCTIVE SLEEP APNEA): Status: ACTIVE | Noted: 2020-12-15

## 2021-02-07 DIAGNOSIS — N92.6 IRREGULAR MENSES: ICD-10-CM

## 2021-02-08 RX ORDER — LEVONORGESTREL AND ETHINYL ESTRADIOL AND ETHINYL ESTRADIOL 150-30(84)
KIT ORAL
Qty: 91 TABLET | Refills: 0 | Status: SHIPPED | OUTPATIENT
Start: 2021-02-08 | End: 2022-05-12

## 2021-04-05 DIAGNOSIS — F41.1 GAD (GENERALIZED ANXIETY DISORDER): ICD-10-CM

## 2021-04-05 RX ORDER — ALPRAZOLAM 0.5 MG/1
0.5 TABLET ORAL 3 TIMES DAILY PRN
Qty: 90 TABLET | Refills: 0 | Status: SHIPPED | OUTPATIENT
Start: 2021-04-05 | End: 2021-06-09

## 2021-05-04 ENCOUNTER — OFFICE VISIT (OUTPATIENT)
Dept: PRIMARY CARE CLINIC | Age: 41
End: 2021-05-04
Payer: COMMERCIAL

## 2021-05-04 VITALS
WEIGHT: 201.6 LBS | RESPIRATION RATE: 16 BRPM | BODY MASS INDEX: 34.6 KG/M2 | DIASTOLIC BLOOD PRESSURE: 78 MMHG | SYSTOLIC BLOOD PRESSURE: 122 MMHG | HEART RATE: 76 BPM

## 2021-05-04 DIAGNOSIS — I88.9 CERVICAL LYMPHADENITIS: ICD-10-CM

## 2021-05-04 DIAGNOSIS — F41.1 GAD (GENERALIZED ANXIETY DISORDER): Primary | ICD-10-CM

## 2021-05-04 PROCEDURE — 99213 OFFICE O/P EST LOW 20 MIN: CPT | Performed by: FAMILY MEDICINE

## 2021-05-04 RX ORDER — SULFAMETHOXAZOLE AND TRIMETHOPRIM 800; 160 MG/1; MG/1
1 TABLET ORAL 2 TIMES DAILY
Qty: 20 TABLET | Refills: 0 | Status: SHIPPED | OUTPATIENT
Start: 2021-05-04 | End: 2021-05-14

## 2021-05-04 NOTE — PROGRESS NOTES
Anxiety and Depression: Patient is here for of evaluation of anxiety disorder. Patient states that she is in finals for school so she is anxious right now. She denies current suicidal and homicidal ideation. Risk factors: none Previous treatment includes Celexa, Pristiq, and Xanax as needed which she said she takes about once a day. She complains of the following side effects from the treatment: none. Headache: Patient was put on Topamax at her last appointment. She said that it made her tired so she stopped taking it. She said that she went to the chiropractor to get adjusted and wears a  at night for her teeth grinding and that seems to be helping. She also states that she has a bump behind her right ear. She said that she would like to get it checked out. She said she first noticed it last week, and it has not gotten bigger since she first felt like.  She said it feels like a cyst.       Allergies:  Prochlorperazine edisylate    Past Medical History:    Past Medical History:   Diagnosis Date    Abdominal pain     Anxiety     Carpal tunnel syndrome     Depression     Herpes zoster     Low back pain        Past Surgical History:    Past Surgical History:   Procedure Laterality Date    ABDOMINOPLASTY  2004    BREAST REDUCTION SURGERY  2003    BREAST REDUCTION SURGERY Bilateral 2003    CHOLECYSTECTOMY, LAPAROSCOPIC      with cholangiograms    GASTRIC RESTRICTION SURGERY  06/24/2020    laparoscopic gastric sleeve surgery, DR Abby Walls    HERNIA REPAIR N/A 30/21/5336    UMBILICAL HERNIA REPAIR WITH MESH       Social History:   Social History     Tobacco Use    Smoking status: Never Smoker    Smokeless tobacco: Never Used   Substance Use Topics    Alcohol use: Yes     Comment: socially       Family History:   Family History   Problem Relation Age of Onset    Diabetes Father     Other Mother         dvt, not hereditary     Cancer Mother         carcinoid    Other Maternal Grandmother aortic & abdominal aneurysms    Breast Cancer Maternal Grandmother     Cancer Paternal Grandmother         brain    Cancer Paternal Grandfather         leukemia         Review of Systems:  Constitutional: negative for fevers or chills  Eyes: negative for visual disturbance   ENT: negative for sore throat or nasal congestion. Has swelling rt postauricular region  Respiratory: negative for cough, shortness of breath and sputum  Cardiovascular: negative for chest pain or palpitations  Gastrointestinal: negative for abd pain, nausea, vomiting, diarrhea or constipation  Neurological: negative for unilateral weakness, numbness or tingling. Psych: has a diagnosis of anxiety  Sleep-has occasional problems                                                                        Appetite-normal  Interest-normal                                                                     Suicidal Ideation-no  Guilt-no                                                                          Psycomotor Activity- normal  Energy-normal  Concentration-normal                                                           Mood -improved    Objective:  /78 (Site: Right Upper Arm, Position: Sitting)   Pulse 76   Resp 16   Wt 201 lb 9.6 oz (91.4 kg)   BMI 34.60 kg/m²  Body mass index is 34.6 kg/m². She is oriented. HEENT - has  Rt post auricular lymphadenopathy  Cardiovascular: Regular rate and rhythm,no murmer or gallop  Lungs: clear to auscultation bilaterally,no wheezing & no retractions  Extremities:  No edema No calf tenderness  Neuro: Motor power normal.No sensory deficit. Coordination normal.   Psych: Alert and oriented, appropriate affect. NO SUICIDAL OR HOMICIDAL THOUHTS      Assessment:  1. ADRIEN (generalized anxiety disorder)    2. Cervical lymphadenitis          Plan:  No orders of the defined types were placed in this encounter.     Orders Placed This Encounter   Medications    sulfamethoxazole-trimethoprim (BACTRIM DS;SEPTRA DS) 800-160 MG per tablet     Sig: Take 1 tablet by mouth 2 times daily for 10 days     Dispense:  20 tablet     Refill:  0     No follow-ups on file.      Electronically signed by Clint Nolen MD on 5/4/2021 at 9:58 AM

## 2021-05-06 ENCOUNTER — HOSPITAL ENCOUNTER (OUTPATIENT)
Age: 41
Setting detail: SPECIMEN
Discharge: HOME | End: 2021-05-06
Payer: COMMERCIAL

## 2021-05-06 ENCOUNTER — OFFICE VISIT (OUTPATIENT)
Dept: OBGYN | Age: 41
End: 2021-05-06
Payer: COMMERCIAL

## 2021-05-06 VITALS
DIASTOLIC BLOOD PRESSURE: 84 MMHG | BODY MASS INDEX: 31.48 KG/M2 | WEIGHT: 200.6 LBS | HEIGHT: 67 IN | SYSTOLIC BLOOD PRESSURE: 132 MMHG

## 2021-05-06 DIAGNOSIS — Z12.4 SCREENING FOR MALIGNANT NEOPLASM OF CERVIX: ICD-10-CM

## 2021-05-06 DIAGNOSIS — Z01.419 ENCOUNTER FOR ANNUAL ROUTINE GYNECOLOGICAL EXAMINATION: ICD-10-CM

## 2021-05-06 DIAGNOSIS — N92.6 IRREGULAR MENSES: Primary | ICD-10-CM

## 2021-05-06 DIAGNOSIS — Z12.31 SCREENING MAMMOGRAM FOR HIGH-RISK PATIENT: ICD-10-CM

## 2021-05-06 PROCEDURE — 99396 PREV VISIT EST AGE 40-64: CPT | Performed by: ADVANCED PRACTICE MIDWIFE

## 2021-05-06 PROCEDURE — 87624 HPV HI-RISK TYP POOLED RSLT: CPT

## 2021-05-06 PROCEDURE — G0145 SCR C/V CYTO,THINLAYER,RESCR: HCPCS

## 2021-05-06 RX ORDER — LEVONORGESTREL / ETHINYL ESTRADIOL AND ETHINYL ESTRADIOL 150-30(84)
1 KIT ORAL DAILY
Qty: 91 TABLET | Refills: 3 | Status: SHIPPED | OUTPATIENT
Start: 2021-05-06 | End: 2022-01-18

## 2021-05-06 NOTE — PROGRESS NOTES
YEARLY PHYSICAL    Date of service: 2021    Flores Huff  Is a 42 y.o.   female    PT's PCP is: Chika Rojas MD     : 1980                                             Subjective:       Patient's last menstrual period was 2021 (approximate). Are your menses regular: no    OB History    Para Term  AB Living   3 3 2 1   3   SAB TAB Ectopic Molar Multiple Live Births             1      # Outcome Date GA Lbr Conrado/2nd Weight Sex Delivery Anes PTL Lv   3 Term 02/27/15 40w0d   F Vag-Spont   VINOD   2 Term  40w0d  8 lb 13 oz (3.997 kg) F Vag-Spont      1   34w0d  4 lb (1.814 kg) F Vag-Spont         Birth Comments: cerclage        Social History     Tobacco Use   Smoking Status Never Smoker   Smokeless Tobacco Never Used        Social History     Substance and Sexual Activity   Alcohol Use Yes    Comment: socially       Family History   Problem Relation Age of Onset    Diabetes Father     Other Mother         dvt, not hereditary     Cancer Mother         carcinoid    Other Maternal Grandmother         aortic & abdominal aneurysms    Breast Cancer Maternal Grandmother     Cancer Paternal Grandmother         brain    Cancer Paternal Grandfather         leukemia       Any family history of breast or ovarian cancer:  Yes    Any family history of blood clots: Yes    Have you had a positive covid test: No    Have you had the covid immunization: No      Allergies: Prochlorperazine edisylate      Current Outpatient Medications:     Levonorgest-Eth Estrad 91-Day 0.15-0.03 &0.01 MG TABS, Take 1 tablet by mouth daily, Disp: 91 tablet, Rfl: 3    sulfamethoxazole-trimethoprim (BACTRIM DS;SEPTRA DS) 800-160 MG per tablet, Take 1 tablet by mouth 2 times daily for 10 days, Disp: 20 tablet, Rfl: 0    desvenlafaxine succinate (PRISTIQ) 100 MG TB24 extended release tablet, Take 1 tablet by mouth daily, Disp: 90 tablet, Rfl: 0    citalopram (CELEXA) 20 MG tablet, Take 1 tablet by mouth once daily, Disp: 90 tablet, Rfl: 0    ASHLYNA 0.15-0.03 &0.01 MG TABS, Take 1 tablet by mouth once daily, Disp: 91 tablet, Rfl: 0    CPAP Machine MISC, Change pressure back to CPAP 13. YANETH G47.33, Disp: , Rfl:     albuterol sulfate HFA (PROAIR HFA) 108 (90 Base) MCG/ACT inhaler, Inhale 2 puffs into the lungs every 6 hours as needed for Wheezing (Patient not taking: Reported on 2/13/2020), Disp: 1 Inhaler, Rfl: 3    Social History     Substance and Sexual Activity   Sexual Activity Yes    Partners: Male    Birth control/protection: Pill       Any bleeding or pain with intercourse: No    Last Yearly:  2019    Last pap: 2019    Last HPV: 2016    Last Mammogram: never    Last Sandrita Aguero never    Last colorectal screen- type:never*  date  never    Do you do self breast exams: Yes    Past Medical History:   Diagnosis Date    Abdominal pain     Anxiety     Carpal tunnel syndrome     Depression     Herpes zoster     Low back pain        Past Surgical History:   Procedure Laterality Date    ABDOMINOPLASTY  2004    BREAST REDUCTION SURGERY  2003    BREAST REDUCTION SURGERY Bilateral 2003    CHOLECYSTECTOMY, LAPAROSCOPIC      with cholangiograms    GASTRIC RESTRICTION SURGERY  06/24/2020    laparoscopic gastric sleeve surgery, DR Jordi Augustin    HERNIA REPAIR N/A 08/15/1830    UMBILICAL HERNIA REPAIR WITH MESH       Family History   Problem Relation Age of Onset    Diabetes Father     Other Mother         dvt, not hereditary     Cancer Mother         carcinoid    Other Maternal Grandmother         aortic & abdominal aneurysms    Breast Cancer Maternal Grandmother     Cancer Paternal Grandmother         brain    Cancer Paternal Grandfather         leukemia       Chief Complaint   Patient presents with    Gynecologic Exam     Yearly exam. last pap 06/19/2019 negative, HPV 2016 ? Discuss cycle control.            PE:  Vital Tenderness absent  Anus/Perineum:  Lesions absent and Masses absent                                                             Assessment and Plan          Diagnosis Orders   1. Irregular menses  Levonorgest-Eth Estrad 91-Day 0.15-0.03 &0.01 MG TABS   2. Screening for malignant neoplasm of cervix  PAP SMEAR   3. Screening mammogram for high-risk patient  DEBI LIANA DIGITAL SCREEN BILATERAL   4. Encounter for annual routine gynecological examination         desires to continue current ocp      I am having Heydi Regina Landis start on [employer] 91-Day. I am also having her maintain her albuterol sulfate HFA, CPAP Machine, Ashlyna, citalopram, desvenlafaxine succinate, and sulfamethoxazole-trimethoprim. Return in about 1 year (around 5/6/2022) for yearly. She was also counseled on her preventative health maintenance recommendations and follow-up. There are no Patient Instructions on file for this visit.     Sophia Chopra,5/7/2021 3:55 PM

## 2021-05-07 ASSESSMENT — ENCOUNTER SYMPTOMS
SHORTNESS OF BREATH: 0
ABDOMINAL PAIN: 0

## 2021-05-10 LAB
CYTOLOGY REPORT: NORMAL
HPV SAMPLE: NORMAL
HPV, GENOTYPE 16: NOT DETECTED
HPV, GENOTYPE 18: NOT DETECTED
HPV, HIGH RISK OTHER: NOT DETECTED
HPV, INTERPRETATION: NORMAL
SPECIMEN DESCRIPTION: NORMAL

## 2021-06-09 DIAGNOSIS — F41.1 GAD (GENERALIZED ANXIETY DISORDER): ICD-10-CM

## 2021-06-09 RX ORDER — ALPRAZOLAM 0.5 MG/1
TABLET ORAL
Qty: 90 TABLET | Refills: 2 | Status: SHIPPED | OUTPATIENT
Start: 2021-06-09 | End: 2021-07-09

## 2021-06-09 NOTE — TELEPHONE ENCOUNTER
Controlled Substance Monitoring:    Acute and Chronic Pain Monitoring:   RX Monitoring 6/9/2021   Attestation -   Periodic Controlled Substance Monitoring No signs of potential drug abuse or diversion identified.

## 2021-06-15 ENCOUNTER — HOSPITAL ENCOUNTER (OUTPATIENT)
Age: 41
Discharge: HOME | End: 2021-06-15
Payer: COMMERCIAL

## 2021-06-15 LAB
ABSOLUTE EOS #: 0.13 K/UL (ref 0–0.44)
ABSOLUTE IMMATURE GRANULOCYTE: <0.03 K/UL (ref 0–0.3)
ABSOLUTE LYMPH #: 1.95 K/UL (ref 1.1–3.7)
ABSOLUTE MONO #: 0.47 K/UL (ref 0.1–1.2)
ALBUMIN SERPL-MCNC: 4.1 G/DL (ref 3.5–5.2)
ALBUMIN/GLOBULIN RATIO: 1.2 (ref 1–2.5)
ALP BLD-CCNC: 107 U/L (ref 35–104)
ALT SERPL-CCNC: 21 U/L (ref 5–33)
ANION GAP SERPL CALCULATED.3IONS-SCNC: 10 MMOL/L (ref 9–17)
AST SERPL-CCNC: 19 U/L
BASOPHILS # BLD: 0 % (ref 0–2)
BASOPHILS ABSOLUTE: 0.03 K/UL (ref 0–0.2)
BILIRUB SERPL-MCNC: 0.34 MG/DL (ref 0.3–1.2)
BUN BLDV-MCNC: 12 MG/DL (ref 6–20)
BUN/CREAT BLD: 18 (ref 9–20)
CALCIUM SERPL-MCNC: 9.6 MG/DL (ref 8.6–10.4)
CHLORIDE BLD-SCNC: 105 MMOL/L (ref 98–107)
CO2: 24 MMOL/L (ref 20–31)
CREAT SERPL-MCNC: 0.65 MG/DL (ref 0.5–0.9)
DIFFERENTIAL TYPE: ABNORMAL
EOSINOPHILS RELATIVE PERCENT: 2 % (ref 1–4)
FERRITIN: 138 UG/L (ref 13–150)
FOLATE: 19.2 NG/ML
GFR AFRICAN AMERICAN: >60 ML/MIN
GFR NON-AFRICAN AMERICAN: >60 ML/MIN
GFR SERPL CREATININE-BSD FRML MDRD: ABNORMAL ML/MIN/{1.73_M2}
GFR SERPL CREATININE-BSD FRML MDRD: ABNORMAL ML/MIN/{1.73_M2}
GLUCOSE BLD-MCNC: 81 MG/DL (ref 70–99)
HCT VFR BLD CALC: 43.4 % (ref 36.3–47.1)
HEMOGLOBIN: 14.1 G/DL (ref 11.9–15.1)
IMMATURE GRANULOCYTES: 0 %
IRON: 172 UG/DL (ref 37–145)
LYMPHOCYTES # BLD: 26 % (ref 24–43)
MAGNESIUM: 2.2 MG/DL (ref 1.6–2.6)
MCH RBC QN AUTO: 31.1 PG (ref 25.2–33.5)
MCHC RBC AUTO-ENTMCNC: 32.5 G/DL (ref 28.4–34.8)
MCV RBC AUTO: 95.8 FL (ref 82.6–102.9)
MONOCYTES # BLD: 6 % (ref 3–12)
NRBC AUTOMATED: 0 PER 100 WBC
PDW BLD-RTO: 12.9 % (ref 11.8–14.4)
PHOSPHORUS: 2.3 MG/DL (ref 2.6–4.5)
PLATELET # BLD: 401 K/UL (ref 138–453)
PLATELET ESTIMATE: ABNORMAL
PMV BLD AUTO: 9.2 FL (ref 8.1–13.5)
POTASSIUM SERPL-SCNC: 3.7 MMOL/L (ref 3.7–5.3)
RBC # BLD: 4.53 M/UL (ref 3.95–5.11)
RBC # BLD: ABNORMAL 10*6/UL
SEG NEUTROPHILS: 66 % (ref 36–65)
SEGMENTED NEUTROPHILS ABSOLUTE COUNT: 4.99 K/UL (ref 1.5–8.1)
SODIUM BLD-SCNC: 139 MMOL/L (ref 135–144)
TOTAL PROTEIN: 7.4 G/DL (ref 6.4–8.3)
VITAMIN B-12: 1205 PG/ML (ref 232–1245)
VITAMIN D 25-HYDROXY: 72.4 NG/ML (ref 30–100)
WBC # BLD: 7.6 K/UL (ref 3.5–11.3)
WBC # BLD: ABNORMAL 10*3/UL

## 2021-06-15 PROCEDURE — 82306 VITAMIN D 25 HYDROXY: CPT

## 2021-06-15 PROCEDURE — 83735 ASSAY OF MAGNESIUM: CPT

## 2021-06-15 PROCEDURE — 83540 ASSAY OF IRON: CPT

## 2021-06-15 PROCEDURE — 82607 VITAMIN B-12: CPT

## 2021-06-15 PROCEDURE — 85025 COMPLETE CBC W/AUTO DIFF WBC: CPT

## 2021-06-15 PROCEDURE — 82746 ASSAY OF FOLIC ACID SERUM: CPT

## 2021-06-15 PROCEDURE — 84425 ASSAY OF VITAMIN B-1: CPT

## 2021-06-15 PROCEDURE — 84100 ASSAY OF PHOSPHORUS: CPT

## 2021-06-15 PROCEDURE — 82728 ASSAY OF FERRITIN: CPT

## 2021-06-15 PROCEDURE — 36415 COLL VENOUS BLD VENIPUNCTURE: CPT

## 2021-06-15 PROCEDURE — 80053 COMPREHEN METABOLIC PANEL: CPT

## 2021-06-19 LAB — VITAMIN B1 WHOLE BLOOD: 123 NMOL/L (ref 70–180)

## 2021-06-29 RX ORDER — ACYCLOVIR 400 MG/1
400 TABLET ORAL
Qty: 50 TABLET | Refills: 2 | Status: SHIPPED | OUTPATIENT
Start: 2021-06-29 | End: 2021-07-09

## 2021-08-24 ENCOUNTER — TELEMEDICINE (OUTPATIENT)
Dept: PRIMARY CARE CLINIC | Age: 41
End: 2021-08-24
Payer: COMMERCIAL

## 2021-08-24 DIAGNOSIS — R50.9 FEVER, UNSPECIFIED FEVER CAUSE: Primary | ICD-10-CM

## 2021-08-24 DIAGNOSIS — J01.10 ACUTE FRONTAL SINUSITIS, RECURRENCE NOT SPECIFIED: ICD-10-CM

## 2021-08-24 PROCEDURE — 99213 OFFICE O/P EST LOW 20 MIN: CPT | Performed by: FAMILY MEDICINE

## 2021-08-24 RX ORDER — AMOXICILLIN AND CLAVULANATE POTASSIUM 875; 125 MG/1; MG/1
1 TABLET, FILM COATED ORAL 2 TIMES DAILY
Qty: 20 TABLET | Refills: 0 | Status: SHIPPED | OUTPATIENT
Start: 2021-08-24 | End: 2021-09-03

## 2021-12-28 RX ORDER — DESVENLAFAXINE 100 MG/1
TABLET, EXTENDED RELEASE ORAL
Qty: 30 TABLET | Refills: 0 | Status: SHIPPED | OUTPATIENT
Start: 2021-12-28 | End: 2022-01-18 | Stop reason: SDUPTHER

## 2022-01-18 ENCOUNTER — OFFICE VISIT (OUTPATIENT)
Dept: PRIMARY CARE CLINIC | Age: 42
End: 2022-01-18
Payer: COMMERCIAL

## 2022-01-18 VITALS
BODY MASS INDEX: 29.86 KG/M2 | RESPIRATION RATE: 16 BRPM | HEART RATE: 72 BPM | SYSTOLIC BLOOD PRESSURE: 124 MMHG | DIASTOLIC BLOOD PRESSURE: 78 MMHG | WEIGHT: 187.8 LBS

## 2022-01-18 DIAGNOSIS — F33.41 RECURRENT MAJOR DEPRESSIVE DISORDER, IN PARTIAL REMISSION (HCC): Primary | ICD-10-CM

## 2022-01-18 DIAGNOSIS — R53.83 FATIGUE, UNSPECIFIED TYPE: ICD-10-CM

## 2022-01-18 DIAGNOSIS — F41.1 GAD (GENERALIZED ANXIETY DISORDER): ICD-10-CM

## 2022-01-18 PROCEDURE — 99214 OFFICE O/P EST MOD 30 MIN: CPT | Performed by: FAMILY MEDICINE

## 2022-01-18 RX ORDER — ALPRAZOLAM 0.5 MG/1
TABLET ORAL
COMMUNITY
Start: 2021-12-03 | End: 2022-01-21

## 2022-01-18 RX ORDER — ARIPIPRAZOLE 5 MG/1
5 TABLET ORAL DAILY
Qty: 30 TABLET | Refills: 0 | Status: SHIPPED | OUTPATIENT
Start: 2022-01-18 | End: 2022-02-18 | Stop reason: ALTCHOICE

## 2022-01-18 RX ORDER — CITALOPRAM 20 MG/1
20 TABLET ORAL DAILY
Qty: 90 TABLET | Refills: 0 | Status: SHIPPED | OUTPATIENT
Start: 2022-01-18 | End: 2022-03-22 | Stop reason: DRUGHIGH

## 2022-01-18 RX ORDER — DESVENLAFAXINE 100 MG/1
TABLET, EXTENDED RELEASE ORAL
Qty: 30 TABLET | Refills: 0 | Status: SHIPPED | OUTPATIENT
Start: 2022-01-18 | End: 2022-02-18 | Stop reason: SDUPTHER

## 2022-01-18 SDOH — ECONOMIC STABILITY: FOOD INSECURITY: WITHIN THE PAST 12 MONTHS, YOU WORRIED THAT YOUR FOOD WOULD RUN OUT BEFORE YOU GOT MONEY TO BUY MORE.: NEVER TRUE

## 2022-01-18 SDOH — ECONOMIC STABILITY: TRANSPORTATION INSECURITY
IN THE PAST 12 MONTHS, HAS THE LACK OF TRANSPORTATION KEPT YOU FROM MEDICAL APPOINTMENTS OR FROM GETTING MEDICATIONS?: NO

## 2022-01-18 SDOH — ECONOMIC STABILITY: FOOD INSECURITY: WITHIN THE PAST 12 MONTHS, THE FOOD YOU BOUGHT JUST DIDN'T LAST AND YOU DIDN'T HAVE MONEY TO GET MORE.: NEVER TRUE

## 2022-01-18 SDOH — ECONOMIC STABILITY: TRANSPORTATION INSECURITY
IN THE PAST 12 MONTHS, HAS LACK OF TRANSPORTATION KEPT YOU FROM MEETINGS, WORK, OR FROM GETTING THINGS NEEDED FOR DAILY LIVING?: NO

## 2022-01-18 ASSESSMENT — PATIENT HEALTH QUESTIONNAIRE - PHQ9
1. LITTLE INTEREST OR PLEASURE IN DOING THINGS: 2
SUM OF ALL RESPONSES TO PHQ QUESTIONS 1-9: 16
8. MOVING OR SPEAKING SO SLOWLY THAT OTHER PEOPLE COULD HAVE NOTICED. OR THE OPPOSITE, BEING SO FIGETY OR RESTLESS THAT YOU HAVE BEEN MOVING AROUND A LOT MORE THAN USUAL: 0
7. TROUBLE CONCENTRATING ON THINGS, SUCH AS READING THE NEWSPAPER OR WATCHING TELEVISION: 3
5. POOR APPETITE OR OVEREATING: 0
SUM OF ALL RESPONSES TO PHQ QUESTIONS 1-9: 16
SUM OF ALL RESPONSES TO PHQ QUESTIONS 1-9: 16
3. TROUBLE FALLING OR STAYING ASLEEP: 3
4. FEELING TIRED OR HAVING LITTLE ENERGY: 3
2. FEELING DOWN, DEPRESSED OR HOPELESS: 2
SUM OF ALL RESPONSES TO PHQ QUESTIONS 1-9: 16
SUM OF ALL RESPONSES TO PHQ9 QUESTIONS 1 & 2: 4
6. FEELING BAD ABOUT YOURSELF - OR THAT YOU ARE A FAILURE OR HAVE LET YOURSELF OR YOUR FAMILY DOWN: 3
9. THOUGHTS THAT YOU WOULD BE BETTER OFF DEAD, OR OF HURTING YOURSELF: 0
10. IF YOU CHECKED OFF ANY PROBLEMS, HOW DIFFICULT HAVE THESE PROBLEMS MADE IT FOR YOU TO DO YOUR WORK, TAKE CARE OF THINGS AT HOME, OR GET ALONG WITH OTHER PEOPLE: 3

## 2022-01-18 ASSESSMENT — SOCIAL DETERMINANTS OF HEALTH (SDOH): HOW HARD IS IT FOR YOU TO PAY FOR THE VERY BASICS LIKE FOOD, HOUSING, MEDICAL CARE, AND HEATING?: NOT HARD AT ALL

## 2022-01-18 NOTE — PATIENT INSTRUCTIONS
SURVEY:    You may be receiving a survey from [employer] regarding your visit today. You may get this in the mail, through your MyChart, or in your email. Please complete the survey to enable us to provide the highest quality of care to you and your family. If you cannot score us a very good (5 Stars) on any question, please call the office to discuss how we could of made your experience exceptional.    Thank you!     Dr. María Schwarz, LPALO Vazquez, RHODA Ruiz, [address] [place] [address] [place]    Phone: [phone]  Fax: [phone]    Office Hours:   [facility] [place], F: 8-5 Wednesday: 9-11

## 2022-01-18 NOTE — PROGRESS NOTES
Anxiety and Depression: Patient is here for of evaluation of anxiety disorder. Patient states that her anxiety and depression has been worse lately. She said that as her weight goes down her depression seems to be getting worse. She said that she has major fatigue, and all her blood work has come back fine. Seh said that she used to used the Xanax PRN but lately she has been having to use it daily. She denies current suicidal and homicidal ideation. Risk factors: none Previous treatment includes Celexa, Pristiq, and Xanax. She complains of the following side effects from the treatment: none. Headache: She said that she still has been getting headaches about every other day. She said that she has arthritis in her neck so she thinks that has something to do with it. She said they may be stress related as well.    Fatigue- has constant fatigue and not able to sleep,has day time sleepiness      Allergies:  Prochlorperazine edisylate    Past Medical History:    Past Medical History:   Diagnosis Date    Abdominal pain     Anxiety     Carpal tunnel syndrome     Depression     Herpes zoster     Low back pain        Past Surgical History:    Past Surgical History:   Procedure Laterality Date    ABDOMINOPLASTY  2004    BREAST REDUCTION SURGERY  2003    BREAST REDUCTION SURGERY Bilateral 2003    CHOLECYSTECTOMY, LAPAROSCOPIC      with cholangiograms    GASTRIC RESTRICTION SURGERY  06/24/2020    laparoscopic gastric sleeve surgery, DR Joanne Warren    HERNIA REPAIR N/A 26/98/1408    UMBILICAL HERNIA REPAIR WITH MESH       Social History:   Social History     Tobacco Use    Smoking status: Never Smoker    Smokeless tobacco: Never Used   Substance Use Topics    Alcohol use: Yes     Comment: socially       Family History:   Family History   Problem Relation Age of Onset    Diabetes Father     Other Mother         dvt, not hereditary     Cancer Mother         carcinoid    Other Maternal Grandmother         aortic & abdominal aneurysms    Breast Cancer Maternal Grandmother     Cancer Paternal Grandmother         brain    Cancer Paternal Grandfather         leukemia         Review of Systems:  Constitutional: negative for fevers or chills  Eyes: negative for visual disturbance   ENT: negative for sore throat or nasal congestion  Respiratory: negative for cough, shortness of breath and sputum  Cardiovascular: negative for chest pain or palpitations  Gastrointestinal: negative for abd pain, nausea, vomiting, diarrhea or constipation  Neurological: negative for unilateral weakness, numbness or tingling. Psych: has a diagnosis of depression, anxiety and insomnia  Sleep-affected                                                                        Appetite-normal  Interest-diminished                                                                     Suicidal Ideation-no  Guilt-no                                                                          Psycomotor Activity- decreased  Energy-diminished  Concentration--diminished                                                           Mood -depressed    Objective:  /78 (Site: Left Upper Arm, Position: Sitting)   Pulse 72   Resp 16   Wt 187 lb 12.8 oz (85.2 kg)   BMI 29.86 kg/m²  Body mass index is 29.86 kg/m². She is oriented. HEENT - normal  Cardiovascular: Regular rate and rhythm,no murmer or gallop  Lungs: clear to auscultation bilaterally,no wheezing & no retractions  Extremities:  No edema No calf tenderness  Neuro: Motor power normal.No sensory deficit. Coordination normal.   Psych: Alert and oriented, appropriate affect. NO SUICIDAL OR HOMICIDAL THOUHTS      Assessment:  1. Recurrent major depressive disorder, in partial remission ([address]    2. ADRIEN (generalized anxiety disorder)    3. Fatigue, unspecified type          Plan:   Diagnosis Orders   1. Recurrent major depressive disorder, in partial remission (HCC) - add abilify 5 mg hs to pristiq and celexa    2. ADRIEN (generalized anxiety disorder) - stable with celexa    3.  Fatigue, unspecified type -check cbc,tsh        Electronically signed by Torie Garcia MD on 1/18/2022 at 9:11 AM

## 2022-01-21 DIAGNOSIS — F41.1 GAD (GENERALIZED ANXIETY DISORDER): Primary | ICD-10-CM

## 2022-01-21 RX ORDER — ALPRAZOLAM 0.5 MG/1
TABLET ORAL
Qty: 90 TABLET | Refills: 0 | Status: SHIPPED | OUTPATIENT
Start: 2022-01-21 | End: 2022-05-12

## 2022-02-18 ENCOUNTER — OFFICE VISIT (OUTPATIENT)
Dept: PRIMARY CARE CLINIC | Age: 42
End: 2022-02-18
Payer: COMMERCIAL

## 2022-02-18 VITALS — WEIGHT: 189 LBS | BODY MASS INDEX: 30.05 KG/M2

## 2022-02-18 DIAGNOSIS — F41.1 GAD (GENERALIZED ANXIETY DISORDER): ICD-10-CM

## 2022-02-18 DIAGNOSIS — F33.41 RECURRENT MAJOR DEPRESSIVE DISORDER, IN PARTIAL REMISSION (HCC): Primary | ICD-10-CM

## 2022-02-18 PROCEDURE — 99213 OFFICE O/P EST LOW 20 MIN: CPT | Performed by: FAMILY MEDICINE

## 2022-02-18 RX ORDER — OLANZAPINE 5 MG/1
5 TABLET ORAL NIGHTLY
Qty: 30 TABLET | Refills: 0 | Status: SHIPPED | OUTPATIENT
Start: 2022-02-18 | End: 2022-03-22 | Stop reason: SDUPTHER

## 2022-02-18 RX ORDER — DESVENLAFAXINE 100 MG/1
TABLET, EXTENDED RELEASE ORAL
Qty: 90 TABLET | Refills: 0 | Status: SHIPPED | OUTPATIENT
Start: 2022-02-18 | End: 2022-05-24

## 2022-02-18 NOTE — PROGRESS NOTES
Anxiety: Patient complains of evaluation of anxiety disorder. She has the following anxiety symptoms: difficulty concentrating, insomnia, palpitations, racing thoughts. Onset of symptoms was approximately several years ago, unchanged since that time. She denies current suicidal and homicidal ideation. Depression: Patient complains of depression. She complains of depressed mood. Onset was approximately several years ago, stable since that time. She denies current suicidal and homicidal plan or intent. Patient states the Abilify does not seem to be working well, she states it did help a little with her depression but seemed to make her anxiety worse.        Allergies:  Prochlorperazine edisylate    Past Medical History:    Past Medical History:   Diagnosis Date    Abdominal pain     Anxiety     Carpal tunnel syndrome     Depression     Herpes zoster     Low back pain        Past Surgical History:    Past Surgical History:   Procedure Laterality Date    ABDOMINOPLASTY  2004    BREAST REDUCTION SURGERY  2003    BREAST REDUCTION SURGERY Bilateral 2003    CHOLECYSTECTOMY, LAPAROSCOPIC      with cholangiograms    GASTRIC RESTRICTION SURGERY  06/24/2020    laparoscopic gastric sleeve surgery, DR Scout Mena    HERNIA REPAIR N/A 96/62/2867    UMBILICAL HERNIA REPAIR WITH MESH       Social History:   Social History     Tobacco Use    Smoking status: Never Smoker    Smokeless tobacco: Never Used   Substance Use Topics    Alcohol use: Yes     Comment: socially       Family History:   Family History   Problem Relation Age of Onset    Diabetes Father     Other Mother         dvt, not hereditary     Cancer Mother         carcinoid    Other Maternal Grandmother         aortic & abdominal aneurysms    Breast Cancer Maternal Grandmother     Cancer Paternal Grandmother         brain    Cancer Paternal Grandfather         leukemia         Review of Systems:  Constitutional: negative for fevers or chills  Eyes: negative for visual disturbance   ENT: negative for sore throat or nasal congestion  Respiratory: negative for cough, shortness of breath and sputum  Cardiovascular: negative for chest pain or palpitations  Gastrointestinal: negative for abd pain, nausea, vomiting, diarrhea or constipation  Neurological: negative for unilateral weakness, numbness or tingling. Psych: has a diagnosis of depression and anxiety  Sleep-affected                                                                        Appetite-normal  Interest-normal                                                                     Suicidal Ideation-no  Guilt-no                                                                          Psycomotor Activity- increased  Energy-normal  Concentration-decreased                                                          Mood -anxious    Objective: Wt 189 lb (85.7 kg)   BMI 30.05 kg/m²  Body mass index is 30.05 kg/m². She is oriented. HEENT -normal  Cardiovascular: Regular rate and rhythm,no murmer or gallop  Lungs: clear to auscultation bilaterally,no wheezing & no retractions  Extremities:  No edema No calf tenderness  Neuro: Motor power normal.No sensory deficit. Coordination normal.   Psych: anxious  NO SUICIDAL OR HOMICIDAL THOUHTS      Assessment:  1. Recurrent major depressive disorder, in partial remission ([facility] [address]    2. ADRIEN (generalized anxiety disorder)          Plan:  No orders of the defined types were placed in this encounter. Orders Placed This Encounter   Medications    desvenlafaxine succinate (PRISTIQ) 100 MG TB24 extended release tablet     Sig: Take 1 tablet by mouth once daily     Dispense:  90 tablet     Refill:  0    OLANZapine (ZYPREXA) 5 MG tablet     Sig: Take 1 tablet by mouth nightly Take 1/2 for 2 wks and them 1 tab daily     Dispense:  30 tablet     Refill:  0     No follow-ups on file.      Electronically signed by Torie Garcia MD on 2/18/2022 at 9:23 AM

## 2022-03-22 ENCOUNTER — OFFICE VISIT (OUTPATIENT)
Dept: PRIMARY CARE CLINIC | Age: 42
End: 2022-03-22
Payer: COMMERCIAL

## 2022-03-22 VITALS
RESPIRATION RATE: 18 BRPM | SYSTOLIC BLOOD PRESSURE: 124 MMHG | WEIGHT: 193 LBS | BODY MASS INDEX: 30.68 KG/M2 | DIASTOLIC BLOOD PRESSURE: 73 MMHG | HEART RATE: 76 BPM

## 2022-03-22 DIAGNOSIS — F33.41 RECURRENT MAJOR DEPRESSIVE DISORDER, IN PARTIAL REMISSION (HCC): Primary | ICD-10-CM

## 2022-03-22 DIAGNOSIS — F41.1 GAD (GENERALIZED ANXIETY DISORDER): ICD-10-CM

## 2022-03-22 PROCEDURE — 99213 OFFICE O/P EST LOW 20 MIN: CPT | Performed by: FAMILY MEDICINE

## 2022-03-22 RX ORDER — OLANZAPINE 5 MG/1
5 TABLET ORAL NIGHTLY
Qty: 90 TABLET | Refills: 0 | Status: SHIPPED | OUTPATIENT
Start: 2022-03-22 | End: 2022-06-13 | Stop reason: SDUPTHER

## 2022-03-22 RX ORDER — CITALOPRAM 20 MG/1
10 TABLET ORAL DAILY
Qty: 90 TABLET | Refills: 0 | Status: SHIPPED
Start: 2022-03-22 | End: 2022-06-01

## 2022-03-22 NOTE — PROGRESS NOTES
Depression: Patient complains of depression. Onset was approximately several years ago, stable since that time. She denies current suicidal and homicidal plan or intent. She complains of the following side effects from the treatment: none. Patient states the Zyprexa is working well and she has not experienced any side effects from it.        Allergies:  Prochlorperazine edisylate    Past Medical History:    Past Medical History:   Diagnosis Date    Abdominal pain     Anxiety     Carpal tunnel syndrome     Depression     Herpes zoster     Low back pain        Past Surgical History:    Past Surgical History:   Procedure Laterality Date    ABDOMINOPLASTY  2004    BREAST REDUCTION SURGERY  2003    BREAST REDUCTION SURGERY Bilateral 2003    CHOLECYSTECTOMY, LAPAROSCOPIC      with cholangiograms    GASTRIC RESTRICTION SURGERY  06/24/2020    laparoscopic gastric sleeve surgery, DR Beauty Chain    HERNIA REPAIR N/A 59/61/7459    UMBILICAL HERNIA REPAIR WITH MESH       Social History:   Social History     Tobacco Use    Smoking status: Never Smoker    Smokeless tobacco: Never Used   Substance Use Topics    Alcohol use: Yes     Comment: socially       Family History:   Family History   Problem Relation Age of Onset    Diabetes Father     Other Mother         dvt, not hereditary     Cancer Mother         carcinoid    Other Maternal Grandmother         aortic & abdominal aneurysms    Breast Cancer Maternal Grandmother     Cancer Paternal Grandmother         brain    Cancer Paternal Grandfather         leukemia         Review of Systems:  Constitutional: negative for fevers or chills  Eyes: negative for visual disturbance   ENT: negative for sore throat or nasal congestion  Respiratory: negative for cough, shortness of breath and sputum  Cardiovascular: negative for chest pain or palpitations  Gastrointestinal: negative for abd pain, nausea, vomiting, diarrhea or constipation  Neurological: negative for unilateral weakness, numbness or tingling. Psych: has a diagnosis of depression and anxiety  Sleep-has night terrors                                                                        Appetite-normal  Interest-normal                                                                     Suicidal Ideation-no  Guilt-no                                                                          Psycomotor Activity- normal  Energy-normal  Concentration-normal                                                           Mood -improved    Objective:  /73 (Site: Left Upper Arm, Position: Sitting)   Pulse 76   Resp 18   Wt 193 lb (87.5 kg)   BMI 30.68 kg/m²  Body mass index is 30.68 kg/m². She is oriented. HEENT -normal  Cardiovascular: Regular rate and rhythm,no murmer or gallop  Lungs: clear to auscultation bilaterally,no wheezing & no retractions  Extremities:  No edema No calf tenderness  Neuro: Motor power normal.No sensory deficit. Coordination normal.   Psych: Alert and oriented, appropriate affect. NO SUICIDAL OR HOMICIDAL THOUHTS      Assessment:  1. Recurrent major depressive disorder, in partial remission ([address]    2. ADRIEN (generalized anxiety disorder)          Plan:  No orders of the defined types were placed in this encounter. Orders Placed This Encounter   Medications    OLANZapine (ZYPREXA) 5 MG tablet     Sig: Take 1 tablet by mouth nightly     Dispense:  90 tablet     Refill:  0    citalopram (CELEXA) 20 MG tablet     Sig: Take 0.5 tablets by mouth daily     Dispense:  90 tablet     Refill:  0     No follow-ups on file.      Electronically signed by Maynor Messina MD on 3/22/2022 at 8:46 AM

## 2022-03-29 ENCOUNTER — TELEPHONE (OUTPATIENT)
Dept: PRIMARY CARE CLINIC | Age: 42
End: 2022-03-29

## 2022-03-29 NOTE — TELEPHONE ENCOUNTER
This nurse called and left a message for the patient, requesting the patient call this nurse back at [phone].

## 2022-04-06 PROBLEM — Z41.1 ENCOUNTER FOR COSMETIC SURGERY: Status: ACTIVE | Noted: 2022-04-06

## 2022-04-20 ENCOUNTER — PATIENT MESSAGE (OUTPATIENT)
Dept: PRIMARY CARE CLINIC | Age: 42
End: 2022-04-20

## 2022-04-21 DIAGNOSIS — Z11.1 SCREENING FOR TUBERCULOSIS: Primary | ICD-10-CM

## 2022-04-21 NOTE — TELEPHONE ENCOUNTER
From: Kelly Moore  To: Dr. Rivera Gram: 4/20/2022 12:26 PM EDT  Subject: Chest X-ray     Hello, I am beginning a new job and test positive for the TB skin test. Would Dr Bernadette Murrieta be willing to order a chest X-ray so that I can get it done? Thank you.      Usman Harden

## 2022-04-25 ENCOUNTER — OFFICE VISIT (OUTPATIENT)
Dept: PRIMARY CARE CLINIC | Age: 42
End: 2022-04-25
Payer: COMMERCIAL

## 2022-04-25 VITALS
RESPIRATION RATE: 16 BRPM | WEIGHT: 194.2 LBS | BODY MASS INDEX: 31.34 KG/M2 | HEART RATE: 83 BPM | DIASTOLIC BLOOD PRESSURE: 67 MMHG | SYSTOLIC BLOOD PRESSURE: 96 MMHG

## 2022-04-25 DIAGNOSIS — E66.9 OBESITY (BMI 30.0-34.9): Primary | ICD-10-CM

## 2022-04-25 PROCEDURE — 99213 OFFICE O/P EST LOW 20 MIN: CPT | Performed by: FAMILY MEDICINE

## 2022-04-25 RX ORDER — CARBOXYMETHYLCELLULOSE/CITRIC 0.75 G
1 CAPSULE ORAL ONCE
Qty: 90 CAPSULE | Refills: 2 | Status: SHIPPED | OUTPATIENT
Start: 2022-04-25 | End: 2022-04-25

## 2022-04-25 NOTE — PROGRESS NOTES
Patient would like to discuss being put on a appetite suppressant due to increased appetite. She had gastric sleeve 2 years ago. She said that she is still making the correct choices food wise, but she is still gaining weight due to being hungry more often. CURRENT ALLERGIES: Prochlorperazine edisylate    PAST MEDICAL HISTORY:   Past Medical History:   Diagnosis Date    Abdominal pain     Anxiety     Carpal tunnel syndrome     Depression     Herpes zoster     Low back pain        SURGICAL HISTORY:   Past Surgical History:   Procedure Laterality Date    ABDOMINOPLASTY  2004    BREAST REDUCTION SURGERY  2003    BREAST REDUCTION SURGERY Bilateral 2003    CHOLECYSTECTOMY, LAPAROSCOPIC      with cholangiograms    GASTRIC RESTRICTION SURGERY  06/24/2020    laparoscopic gastric sleeve surgery, DR Kenan Madrid    HERNIA REPAIR N/A 56/55/6118    UMBILICAL HERNIA REPAIR WITH MESH       FAMILY HISTORY:   Family History   Problem Relation Age of Onset    Diabetes Father     Other Mother         dvt, not hereditary     Cancer Mother         carcinoid    Other Maternal Grandmother         aortic & abdominal aneurysms    Breast Cancer Maternal Grandmother     Cancer Paternal Grandmother         brain    Cancer Paternal Grandfather         leukemia       SOCIAL HISTORY:   Social History     Tobacco Use    Smoking status: Never Smoker    Smokeless tobacco: Never Used   Vaping Use    Vaping Use: Never used   Substance Use Topics    Alcohol use: Yes     Comment: socially    Drug use: No     Prior to Admission medications    Medication Sig Start Date End Date Taking?  Authorizing Provider   Carboxymeth-Cellulose-CitricAc (PLENITY WELCOME KIT) CAPS Take 1 kit by mouth once for 1 dose 4/25/22 4/25/22 Yes Handy Jennings MD   OLANZapine (ZYPREXA) 5 MG tablet Take 1 tablet by mouth nightly 3/22/22  Yes Handy Jennings MD   citalopram (CELEXA) 20 MG tablet Take 0.5 tablets by mouth daily 3/22/22  Yes Deshawn Gonzalez Pollo Conrad MD   desvenlafaxine succinate (PRISTIQ) 100 MG TB24 extended release tablet Take 1 tablet by mouth once daily 2/18/22  Yes Elgin Cross MD   Multiple Vitamins-Minerals (MULTIVITAMIN ADULTS PO) Take by mouth   Yes Historical Provider, MD Lacy Garzon 0.15-0.03 &0.01 MG TABS Take 1 tablet by mouth once daily 2/8/21  Yes Karson Pop, APRN - CNM       Review of Systems:  Constitutional: negative for fevers or chills  Eyes: negative for visual disturbance   ENT: negative for sore throat or nasal congestion  Respiratory: negative for shortness of breath or cough  Cardiovascular: negative for chest pain ,palpitations,pnd,syncope  Gastrointestinal: negative for abd pain, nausea, vomiting, diarrhea , constipation,hemetemesis,parish,blood in stool  Neurological: negative for unilateral weakness, numbness or tingling. Skeletal Muscular: no joint pain,jont swelling,back pain  Depression and anxiety well controlled  Subjective:  Vitals:    04/25/22 1032   BP: 96/67   Pulse: 83   Resp: 16         Exam:  GEN:   A & O x3, no apparent distress  EYES: No gross abnormalities. ENT:ENT exam normal, no neck nodes or sinus tenderness  NECK: normal, supple, no lymphadenopathy,  no carotid bruits  PULM: clear to auscultation bilaterally- no wheezes, rales or rhonchi, normal air movement, no respiratory distress  COR: regular rate & rhythm, no murmurs and no gallops  ABD:  soft, non-tender  EXT: normal strength, tone, and muscle mass, Extremities: + 2 pedal pulses, no edema or calf tenderness, and warm to touch. Normal nails without lesions  Skeletomuscular:  NEURO: Motor and sensory grossly intact  SKIN:  No skin lesions or rashes      Assessment:  1. Obesity (BMI 30.0-34. 9)        Plan:  Discussed various options  Will start plenity  Also to try semaglutide 3 mg daily- off lable use,discussed side effects. Given 10 samples to try,if no side effects and if it works well,will send prescription.  It will not be covered by her insurance. No orders of the defined types were placed in this encounter. No follow-ups on file.    Orders Placed This Encounter   Medications    Carboxymeth-Cellulose-CitricAc (PLENITY WELCOME KIT) CAPS     Sig: Take 1 kit by mouth once for 1 dose     Dispense:  90 capsule     Refill:  2     Medication directions and side effects discussed      Electronically signed by Gustavo Zhao MD on 4/25/2022 at 11:09 Benjamín Faye MD, MD

## 2022-04-25 NOTE — PATIENT INSTRUCTIONS
SURVEY:    You may be receiving a survey from [employer] regarding your visit today. You may get this in the mail, through your MyChart, or in your email. Please complete the survey to enable us to provide the highest quality of care to you and your family. If you cannot score us a very good (5 Stars) on any question, please call the office to discuss how we could of made your experience exceptional.    Thank you!     Dr. Guadalupe Cabrera, RHODA Walden, [address] [place] [address], [address]    Phone: [phone]  Fax: [phone]    Office Hours:   Claudette Saint, [facility] [place], F: 8-5 Wednesday: 9-11

## 2022-05-05 ENCOUNTER — PATIENT MESSAGE (OUTPATIENT)
Dept: PRIMARY CARE CLINIC | Age: 42
End: 2022-05-05

## 2022-05-05 DIAGNOSIS — E66.9 OBESITY (BMI 30.0-34.9): Primary | ICD-10-CM

## 2022-05-05 NOTE — TELEPHONE ENCOUNTER
Verbal order from Dr. Luz Han to order Metformin 1000 mg po qd. Patient can increase to bid if tolerating the medication without side effects.

## 2022-05-05 NOTE — TELEPHONE ENCOUNTER
From: Raphael Brody  To: Dr. Papito Castaneda: 5/5/2022 2:27 PM EDT  Subject: Weight loss med    I spoke to my insurance and they cover metformin as an alternative they they do cover between 1000 and 2000mg would Dr be willing to prescribe this so I will have coverage. They said the higher dosage is prescribed for weight loss. Thank you for all your help.

## 2022-05-05 NOTE — TELEPHONE ENCOUNTER
Debbi Dewey MD 9 minutes ago (1:29 PM)           My insurance is not covering saying that they need more info from new provider but most likely will not cover for weight loss, would he be able to provide any more samples for the increased dose or suggest another med?

## 2022-05-05 NOTE — TELEPHONE ENCOUNTER
From: Bernard Costa  To: Dr. Elysia Limon: 5/5/2022 12:16 PM EDT  Subject: Yee Scott     I finished my sample pack of 3 mg Rybelsus and tolerated fine. Dr Ezra Benoit had planned to increase the dosage as long as I tolerated ok could the 7mg dose be called into [facility] in [place] please ?

## 2022-05-12 DIAGNOSIS — N92.6 IRREGULAR MENSES: ICD-10-CM

## 2022-05-12 DIAGNOSIS — F41.1 GAD (GENERALIZED ANXIETY DISORDER): ICD-10-CM

## 2022-05-12 RX ORDER — LEVONORGESTREL AND ETHINYL ESTRADIOL AND ETHINYL ESTRADIOL 150-30(84)
KIT ORAL
Qty: 91 TABLET | Refills: 0 | Status: SHIPPED | OUTPATIENT
Start: 2022-05-12 | End: 2022-10-13

## 2022-05-12 RX ORDER — LEVONORGESTREL / ETHINYL ESTRADIOL AND ETHINYL ESTRADIOL 150-30(84)
1 KIT ORAL DAILY
Qty: 91 TABLET | Refills: 1 | Status: SHIPPED | OUTPATIENT
Start: 2022-05-12 | End: 2022-08-10 | Stop reason: SDUPTHER

## 2022-05-12 RX ORDER — ALPRAZOLAM 0.5 MG/1
TABLET ORAL
Qty: 90 TABLET | Refills: 0 | Status: SHIPPED | OUTPATIENT
Start: 2022-05-12 | End: 2022-07-26

## 2022-05-24 DIAGNOSIS — E66.9 OBESITY (BMI 30.0-34.9): ICD-10-CM

## 2022-05-24 RX ORDER — DESVENLAFAXINE 100 MG/1
TABLET, EXTENDED RELEASE ORAL
Qty: 90 TABLET | Refills: 0 | Status: SHIPPED | OUTPATIENT
Start: 2022-05-24 | End: 2022-08-22

## 2022-06-01 RX ORDER — CITALOPRAM 20 MG/1
TABLET ORAL
Qty: 90 TABLET | Refills: 0 | Status: SHIPPED | OUTPATIENT
Start: 2022-06-01 | End: 2022-09-06

## 2022-06-13 ENCOUNTER — TELEMEDICINE (OUTPATIENT)
Dept: PRIMARY CARE CLINIC | Age: 42
End: 2022-06-13
Payer: COMMERCIAL

## 2022-06-13 DIAGNOSIS — F33.41 RECURRENT MAJOR DEPRESSIVE DISORDER, IN PARTIAL REMISSION (HCC): Primary | ICD-10-CM

## 2022-06-13 PROCEDURE — 99213 OFFICE O/P EST LOW 20 MIN: CPT | Performed by: FAMILY MEDICINE

## 2022-06-13 RX ORDER — OLANZAPINE 7.5 MG/1
7.5 TABLET ORAL NIGHTLY
Qty: 30 TABLET | Refills: 0 | Status: SHIPPED | OUTPATIENT
Start: 2022-06-13 | End: 2022-07-21

## 2022-06-13 NOTE — PROGRESS NOTES
Due to this being a TeleHealth encounter (During PTDGN-27 public health emergency), evaluation of the following organ systems was limited: Vitals/Constitutional/EENT/Resp/CV/GI//MS/Neuro/Skin/Heme-Lymph-Imm. Pursuant to the emergency declaration under the [address] [place] [address] and the [employer] Act, this Virtual Visit was conducted with patient's (and/or legal guardian's) verbal consent, to reduce the patient's risk of exposure to COVID-19 and provide necessary medical care. The patient (and/or legal guardian) has also been advised to contact this office for worsening conditions or problems, and seek emergency medical treatment and/or call 911 if deemed necessary. Services were provided through a video synchronous discussion virtually to substitute for in-person clinic visit. Patient was located at their individual home.          Allergies:  Prochlorperazine edisylate    Past Medical History:    Past Medical History:   Diagnosis Date    Abdominal pain     Anxiety     Carpal tunnel syndrome     Depression     Herpes zoster     Low back pain        Past Surgical History:    Past Surgical History:   Procedure Laterality Date    ABDOMINOPLASTY  2004    BREAST REDUCTION SURGERY  2003    BREAST REDUCTION SURGERY Bilateral 2003    CHOLECYSTECTOMY, LAPAROSCOPIC      with cholangiograms    GASTRIC RESTRICTION SURGERY  06/24/2020    laparoscopic gastric sleeve surgery, DR Azael Germain    HERNIA REPAIR N/A 24/43/8792    UMBILICAL HERNIA REPAIR WITH MESH       Social History:   Social History     Tobacco Use    Smoking status: Never Smoker    Smokeless tobacco: Never Used   Substance Use Topics    Alcohol use: Yes     Comment: socially       Family History:   Family History   Problem Relation Age of Onset    Diabetes Father     Other Mother         dvt, not hereditary     Cancer Mother         carcinoid    Other Maternal Grandmother         aortic & abdominal aneurysms    Breast Cancer Maternal Grandmother     Cancer Paternal Grandmother         brain    Cancer Paternal Grandfather         leukemia         Review of Systems:  Constitutional: negative for fevers or chills  Eyes: negative for visual disturbance   ENT: negative for sore throat or nasal congestion  Respiratory: negative for cough, shortness of breath and sputum  Cardiovascular: negative for chest pain or palpitations  Gastrointestinal: negative for abd pain, nausea, vomiting, diarrhea or constipation  Neurological: negative for unilateral weakness, numbness or tingling. Psych: has a diagnosis of depression  Sleep-more                                                                        Appetite-normal  Interest-diminished                                                                     Suicidal Ideation-no  Guilt-no                                                                          Psycomotor Activity- decreased  Energy-decreased  Concentration-diminished                                                           Mood -depressed    Objective:  Psych: has depressed mood  NO SUICIDAL OR HOMICIDAL THOUHTS      Assessment:    ICD-10-CM    1. Recurrent major depressive disorder, in partial remission ([facility]  F33.41            Plan:   Diagnosis Orders   1. Recurrent major [facility], [facility] ([facility]         No orders of the defined types were placed in this encounter. Orders Placed This Encounter   Medications    OLANZapine (ZYPREXA) 7.5 MG tablet     Sig: Take 1 tablet by mouth nightly     Dispense:  30 tablet     Refill:  0     No follow-ups on file.      Electronically signed by Krystian Mejia MD on 6/13/2022 at 4:31 PM

## 2022-06-24 ENCOUNTER — TELEPHONE (OUTPATIENT)
Dept: PRIMARY CARE CLINIC | Age: 42
End: 2022-06-24

## 2022-06-24 RX ORDER — SEMAGLUTIDE 1 MG/.5ML
1 INJECTION, SOLUTION SUBCUTANEOUS
Qty: 2 ML | Refills: 0 | Status: SHIPPED | OUTPATIENT
Start: 2022-06-24 | End: 2022-06-27 | Stop reason: DRUGHIGH

## 2022-06-24 NOTE — TELEPHONE ENCOUNTER
Rx was approved, however rx that was sent was for 2.4mg which is the maintenance dose. Do you want to start her out at a lower dose and work up to that dosage?

## 2022-06-27 RX ORDER — SEMAGLUTIDE 0.25 MG/.5ML
0.25 INJECTION, SOLUTION SUBCUTANEOUS
Qty: 2 ML | Refills: 0 | Status: SHIPPED | OUTPATIENT
Start: 2022-06-27 | End: 2022-07-25

## 2022-07-21 RX ORDER — OLANZAPINE 7.5 MG/1
7.5 TABLET ORAL NIGHTLY
Qty: 30 TABLET | Refills: 0 | Status: SHIPPED | OUTPATIENT
Start: 2022-07-21 | End: 2022-08-08 | Stop reason: DRUGHIGH

## 2022-07-25 RX ORDER — SEMAGLUTIDE 0.25 MG/.5ML
INJECTION, SOLUTION SUBCUTANEOUS
Qty: 4 ML | Refills: 0 | OUTPATIENT
Start: 2022-07-25

## 2022-07-25 RX ORDER — SEMAGLUTIDE 0.25 MG/.5ML
0.5 INJECTION, SOLUTION SUBCUTANEOUS
Qty: 4 ML | Refills: 0 | Status: SHIPPED | OUTPATIENT
Start: 2022-07-25 | End: 2022-08-11

## 2022-07-26 DIAGNOSIS — F41.1 GAD (GENERALIZED ANXIETY DISORDER): ICD-10-CM

## 2022-07-26 RX ORDER — ALPRAZOLAM 0.5 MG/1
TABLET ORAL
Qty: 90 TABLET | Refills: 0 | Status: SHIPPED | OUTPATIENT
Start: 2022-07-26 | End: 2022-08-25 | Stop reason: SDUPTHER

## 2022-07-28 RX ORDER — SEMAGLUTIDE 1.34 MG/ML
1 INJECTION, SOLUTION SUBCUTANEOUS
Qty: 3 ML | Refills: 1 | Status: SHIPPED | OUTPATIENT
Start: 2022-07-28 | End: 2022-08-11

## 2022-07-28 NOTE — TELEPHONE ENCOUNTER
New RX for Ozempic sent to [facility] due to [facility] being on back order currently.

## 2022-08-08 ENCOUNTER — PATIENT MESSAGE (OUTPATIENT)
Dept: PRIMARY CARE CLINIC | Age: 42
End: 2022-08-08

## 2022-08-08 RX ORDER — OLANZAPINE 10 MG/1
10 TABLET ORAL NIGHTLY
Qty: 30 TABLET | Refills: 3 | Status: SHIPPED | OUTPATIENT
Start: 2022-08-08

## 2022-08-10 DIAGNOSIS — N92.6 IRREGULAR MENSES: ICD-10-CM

## 2022-08-10 RX ORDER — LEVONORGESTREL / ETHINYL ESTRADIOL AND ETHINYL ESTRADIOL 150-30(84)
1 KIT ORAL DAILY
Qty: 1 PACKET | Refills: 1 | Status: SHIPPED | OUTPATIENT
Start: 2022-08-10 | End: 2022-09-16 | Stop reason: SDUPTHER

## 2022-08-10 NOTE — TELEPHONE ENCOUNTER
Pt couldn't get scheduled for her yearly until January. Will be out of pills before then.  Order pended

## 2022-08-11 ENCOUNTER — PATIENT MESSAGE (OUTPATIENT)
Dept: PRIMARY CARE CLINIC | Age: 42
End: 2022-08-11

## 2022-08-11 DIAGNOSIS — E66.9 OBESITY (BMI 30.0-34.9): Primary | ICD-10-CM

## 2022-08-11 RX ORDER — SEMAGLUTIDE 1.7 MG/.75ML
1.7 INJECTION, SOLUTION SUBCUTANEOUS
Qty: 4 ML | Refills: 0 | Status: SHIPPED | OUTPATIENT
Start: 2022-08-11 | End: 2022-09-10

## 2022-08-11 NOTE — TELEPHONE ENCOUNTER
From: Mena Jacobs  To: Dr. Pratt Cargo: 8/11/2022 11:49 AM EDT  Subject: Question     Adriana my daughter Mario Flowers needs a PCP and I was wondering if Dr Sonya Joaquin was taking new patients? Also I am [place] to move up on my wegovy to rhe 1.7 mg dose could I have that called into the pharmacy? Thank you so much.      Alexandria Villarreal

## 2022-08-22 RX ORDER — DESVENLAFAXINE 100 MG/1
TABLET, EXTENDED RELEASE ORAL
Qty: 90 TABLET | Refills: 0 | Status: SHIPPED | OUTPATIENT
Start: 2022-08-22

## 2022-08-25 ENCOUNTER — TELEPHONE (OUTPATIENT)
Dept: PRIMARY CARE CLINIC | Age: 42
End: 2022-08-25

## 2022-08-25 DIAGNOSIS — F41.1 GAD (GENERALIZED ANXIETY DISORDER): ICD-10-CM

## 2022-08-25 RX ORDER — ALPRAZOLAM 1 MG/1
1 TABLET ORAL 3 TIMES DAILY PRN
Qty: 21 TABLET | Refills: 0 | Status: SHIPPED | OUTPATIENT
Start: 2022-08-25 | End: 2022-08-29 | Stop reason: ALTCHOICE

## 2022-08-25 NOTE — TELEPHONE ENCOUNTER
Patient has not had a response from Dr. Martin Carter regarding increasing the Xanax to 1 mg po tid. Reviewed this case with Dr. Rosey Johnson. Dr. Rosey Johnson gave verbal order to increase Xanax to 1 mg po tid prn anxiety and she can review with Dr. Martin Carter next week. Patient informed. Patient will need prescription sent to [facility] for 7 days supply. Ordered pended to Dr. Rosey Johnson.

## 2022-08-29 DIAGNOSIS — F41.1 GAD (GENERALIZED ANXIETY DISORDER): Primary | ICD-10-CM

## 2022-08-29 RX ORDER — CLONAZEPAM 0.5 MG/1
0.5 TABLET ORAL 2 TIMES DAILY PRN
Qty: 60 TABLET | Refills: 0 | Status: SHIPPED | OUTPATIENT
Start: 2022-08-29 | End: 2022-09-01 | Stop reason: SINTOL

## 2022-09-01 DIAGNOSIS — F41.1 GAD (GENERALIZED ANXIETY DISORDER): Primary | ICD-10-CM

## 2022-09-01 RX ORDER — ALPRAZOLAM 1 MG/1
1 TABLET ORAL 3 TIMES DAILY PRN
Qty: 60 TABLET | Refills: 0 | Status: SHIPPED | OUTPATIENT
Start: 2022-09-01 | End: 2022-09-23

## 2022-09-06 RX ORDER — CITALOPRAM 20 MG/1
TABLET ORAL
Qty: 90 TABLET | Refills: 0 | Status: SHIPPED | OUTPATIENT
Start: 2022-09-06

## 2022-09-15 DIAGNOSIS — N92.6 IRREGULAR MENSES: ICD-10-CM

## 2022-09-16 RX ORDER — LEVONORGESTREL / ETHINYL ESTRADIOL AND ETHINYL ESTRADIOL 150-30(84)
1 KIT ORAL DAILY
Qty: 1 PACKET | Refills: 5 | Status: SHIPPED | OUTPATIENT
Start: 2022-09-16 | End: 2022-10-14

## 2022-09-23 DIAGNOSIS — E66.9 OBESITY (BMI 30.0-34.9): ICD-10-CM

## 2022-09-23 DIAGNOSIS — F41.1 GAD (GENERALIZED ANXIETY DISORDER): ICD-10-CM

## 2022-09-23 RX ORDER — SEMAGLUTIDE 2.4 MG/.75ML
2.4 INJECTION, SOLUTION SUBCUTANEOUS
Qty: 3 ML | Refills: 0 | Status: SHIPPED | OUTPATIENT
Start: 2022-09-23 | End: 2022-11-04

## 2022-09-23 RX ORDER — ALPRAZOLAM 1 MG/1
TABLET ORAL
Qty: 90 TABLET | Refills: 0 | Status: SHIPPED | OUTPATIENT
Start: 2022-09-23 | End: 2022-10-23

## 2022-10-13 ENCOUNTER — OFFICE VISIT (OUTPATIENT)
Dept: PRIMARY CARE CLINIC | Age: 42
End: 2022-10-13
Payer: COMMERCIAL

## 2022-10-13 VITALS
HEART RATE: 83 BPM | WEIGHT: 193.8 LBS | SYSTOLIC BLOOD PRESSURE: 103 MMHG | DIASTOLIC BLOOD PRESSURE: 71 MMHG | RESPIRATION RATE: 16 BRPM | BODY MASS INDEX: 31.28 KG/M2

## 2022-10-13 DIAGNOSIS — F33.41 RECURRENT MAJOR DEPRESSIVE DISORDER, IN PARTIAL REMISSION (HCC): ICD-10-CM

## 2022-10-13 DIAGNOSIS — F41.1 GAD (GENERALIZED ANXIETY DISORDER): Primary | ICD-10-CM

## 2022-10-13 PROCEDURE — 99213 OFFICE O/P EST LOW 20 MIN: CPT | Performed by: FAMILY MEDICINE

## 2022-10-13 RX ORDER — BUSPIRONE HYDROCHLORIDE 5 MG/1
5 TABLET ORAL 3 TIMES DAILY PRN
Qty: 90 TABLET | Refills: 3 | Status: SHIPPED | OUTPATIENT
Start: 2022-10-13 | End: 2022-11-12

## 2022-10-13 ASSESSMENT — PATIENT HEALTH QUESTIONNAIRE - PHQ9
3. TROUBLE FALLING OR STAYING ASLEEP: 3
6. FEELING BAD ABOUT YOURSELF - OR THAT YOU ARE A FAILURE OR HAVE LET YOURSELF OR YOUR FAMILY DOWN: 3
SUM OF ALL RESPONSES TO PHQ QUESTIONS 1-9: 19
4. FEELING TIRED OR HAVING LITTLE ENERGY: 3
2. FEELING DOWN, DEPRESSED OR HOPELESS: 2
8. MOVING OR SPEAKING SO SLOWLY THAT OTHER PEOPLE COULD HAVE NOTICED. OR THE OPPOSITE, BEING SO FIGETY OR RESTLESS THAT YOU HAVE BEEN MOVING AROUND A LOT MORE THAN USUAL: 3
SUM OF ALL RESPONSES TO PHQ9 QUESTIONS 1 & 2: 4
SUM OF ALL RESPONSES TO PHQ QUESTIONS 1-9: 19
1. LITTLE INTEREST OR PLEASURE IN DOING THINGS: 2
5. POOR APPETITE OR OVEREATING: 0
SUM OF ALL RESPONSES TO PHQ QUESTIONS 1-9: 19
7. TROUBLE CONCENTRATING ON THINGS, SUCH AS READING THE NEWSPAPER OR WATCHING TELEVISION: 3
10. IF YOU CHECKED OFF ANY PROBLEMS, HOW DIFFICULT HAVE THESE PROBLEMS MADE IT FOR YOU TO DO YOUR WORK, TAKE CARE OF THINGS AT HOME, OR GET ALONG WITH OTHER PEOPLE: 2
SUM OF ALL RESPONSES TO PHQ QUESTIONS 1-9: 19
9. THOUGHTS THAT YOU WOULD BE BETTER OFF DEAD, OR OF HURTING YOURSELF: 0

## 2022-10-13 NOTE — PATIENT INSTRUCTIONS
SURVEY:    You may be receiving a survey from [employer] regarding your visit today. You may get this in the mail, through your MyChart, or in your email. Please complete the survey to enable us to provide the highest quality of care to you and your family. If you cannot score us a very good (5 Stars) on any question, please call the office to discuss how we could of made your experience exceptional.    Thank you!     Dr. Carlos Smith, ALO  [facility], [address] [place] [address], [address] [place] [address]    Phone: [phone]  Fax: [phone]    Office Hours:   Shawna Simmons, [address] [facility], F: 8-5

## 2022-10-13 NOTE — PROGRESS NOTES
Anxiety and Depression: Patient is here for of evaluation of anxiety disorder. She has the following anxiety symptoms: difficulty concentrating, fatigue, feelings of losing control, irritable, racing thoughts. Patient states that her anxiety has gotten worse. She said to the point she feels like she is having panic attacks. She said she thinks it got worse with the start of her job, so about a month ago. She states she is so tired, and feels like she could sleep all the time. She states the depression is about the same since the last time she was in. She denies current suicidal and homicidal ideation. Risk factors: none. Previous treatment includes Celexa and Xanax . She complains of the following side effects from the treatment: none. She does not think the medication is helping her as much as it should.       Allergies:  Prochlorperazine edisylate    Past Medical History:    Past Medical History:   Diagnosis Date    Abdominal pain     Anxiety     Carpal tunnel syndrome     Depression     Herpes zoster     Low back pain        Past Surgical History:    Past Surgical History:   Procedure Laterality Date    ABDOMINOPLASTY  2004    BREAST REDUCTION SURGERY  2003    BREAST REDUCTION SURGERY Bilateral 2003    CHOLECYSTECTOMY, LAPAROSCOPIC      with cholangiograms    GASTRIC RESTRICTION SURGERY  06/24/2020    laparoscopic gastric sleeve surgery, DR Sandra Kenny    HERNIA REPAIR N/A 62/19/1403    UMBILICAL HERNIA REPAIR WITH MESH       Social History:   Social History     Tobacco Use    Smoking status: Never    Smokeless tobacco: Never   Substance Use Topics    Alcohol use: Yes     Comment: socially       Family History:   Family History   Problem Relation Age of Onset    Diabetes Father     Other Mother         dvt, not hereditary     Cancer Mother         carcinoid    Other Maternal Grandmother         aortic & abdominal aneurysms    Breast Cancer Maternal Grandmother     Cancer Paternal Grandmother         brain    Cancer Paternal Grandfather         leukemia         Review of Systems:  Constitutional: negative for fevers or chills  Eyes: negative for visual disturbance   ENT: negative for sore throat or nasal congestion  Respiratory: negative for cough, shortness of breath and sputum  Cardiovascular: negative for chest pain or palpitations  Gastrointestinal: negative for abd pain, nausea, vomiting, diarrhea or constipation  Neurological: negative for unilateral weakness, numbness or tingling. Psych: has a diagnosis of depression and anxiety  Sleep-affected                                                                        Appetite-normal  Interest-diminished                                                                     Suicidal Ideation-no  Guilt-yes                                                                          Psycomotor Activity- decreased  Energy-diminished  Concentration-diminished                                                           Mood -anxious    Objective:  /71 (Site: Right Upper Arm, Position: Sitting)   Pulse 83   Resp 16   Wt 193 lb 12.8 oz (87.9 kg)   BMI 31.28 kg/m²  Body mass index is 31.28 kg/m². She is oriented. HEENT -normal  Cardiovascular: Regular rate and rhythm,no murmer or gallop  Lungs: clear to auscultation bilaterally,no wheezing & no retractions  Extremities:  No edema No calf tenderness  Neuro: Motor power normal.No sensory deficit. Coordination normal.   Psych: has anxiety  NO SUICIDAL OR HOMICIDAL THOUHTS      Assessment:    ICD-10-CM    1. ADRIEN (generalized anxiety disorder)  F41.1       2. Recurrent major depressive disorder, in partial [facility] ([facility]  F33.41               Plan:    ICD-10-CM    1. ADRIEN (generalized anxiety disorder) -continues to have anxiety,use xanax prn only,add buspar,advised psych  evaluation,continue counselling F41.1       2.  Recurrent major depressive disorder, in partial remission ([facility]  F33.41           No orders of the defined types were placed in this encounter. Orders Placed This Encounter   Medications    busPIRone (BUSPAR) 5 MG tablet     Sig: Take 1 tablet by mouth 3 times daily as needed (anxiety)     Dispense:  90 tablet     Refill:  3     Return in about 3 months (around 1/13/2023).      Electronically signed by Raymond Krishna MD on 10/13/2022 at 9:16 AM

## 2022-10-17 ENCOUNTER — PATIENT MESSAGE (OUTPATIENT)
Dept: PRIMARY CARE CLINIC | Age: 42
End: 2022-10-17

## 2022-10-17 DIAGNOSIS — F33.41 RECURRENT MAJOR DEPRESSIVE DISORDER, IN PARTIAL REMISSION (HCC): Primary | ICD-10-CM

## 2022-10-17 DIAGNOSIS — F41.1 GAD (GENERALIZED ANXIETY DISORDER): Primary | ICD-10-CM

## 2022-10-17 RX ORDER — CLONAZEPAM 1 MG/1
1 TABLET ORAL 3 TIMES DAILY
Qty: 90 TABLET | Refills: 0 | Status: SHIPPED | OUTPATIENT
Start: 2022-10-17 | End: 2022-11-16

## 2022-10-17 NOTE — TELEPHONE ENCOUNTER
Dr. Samuel Barton, I have pended a psychiatry referral per patient preference for your signature as long as you authorize it.

## 2022-10-17 NOTE — TELEPHONE ENCOUNTER
From: Kelly Nava  To: Dr. Rizzo German: 10/17/2022 10:45 AM EDT  Subject: Anxiety meds     Hello Dr Yesenia Whitaker, the buspar does not seem to provide any relief I made an appt with a psychiatrist but until I get to my appt can I increase my Xanax to 1.5mg TID or the klonopin 1mg TID? Thanks.

## 2022-10-17 NOTE — TELEPHONE ENCOUNTER
From: Kelly Nava  To: Dr. Rizzo German: 10/17/2022 1:12 PM EDT  Subject: Referral needed     I just got a call back one of the psychiatrist offices that I contacted and they require a referral from my primary care physician. Could you please send this referral? Fax number is [phone]. Thank you.      Lew Madera

## 2022-11-04 RX ORDER — SEMAGLUTIDE 2.4 MG/.75ML
INJECTION, SOLUTION SUBCUTANEOUS
Qty: 4 ML | Refills: 0 | Status: SHIPPED | OUTPATIENT
Start: 2022-11-04

## 2022-11-14 RX ORDER — DESVENLAFAXINE 100 MG/1
TABLET, EXTENDED RELEASE ORAL
Qty: 90 TABLET | Refills: 0 | Status: SHIPPED | OUTPATIENT
Start: 2022-11-14 | End: 2022-11-14

## 2022-11-28 ENCOUNTER — PATIENT MESSAGE (OUTPATIENT)
Dept: PRIMARY CARE CLINIC | Age: 42
End: 2022-11-28

## 2022-11-28 RX ORDER — ONDANSETRON 4 MG/1
4 TABLET, ORALLY DISINTEGRATING ORAL EVERY 6 HOURS PRN
Qty: 30 TABLET | Refills: 0 | Status: SHIPPED | OUTPATIENT
Start: 2022-11-28 | End: 2022-12-01 | Stop reason: SDUPTHER

## 2022-11-28 NOTE — TELEPHONE ENCOUNTER
From: Lou Rivera  To: Dr. Matias Estevez: 11/28/2022 10:42 AM EST  Subject: Change of med     The psychiatrist changed some meds and is weaning me off some would you be able to prescribe some zoffran for the n/v?     Edin Reese

## 2022-12-01 ENCOUNTER — TELEPHONE (OUTPATIENT)
Dept: PRIMARY CARE CLINIC | Age: 42
End: 2022-12-01

## 2022-12-01 ENCOUNTER — PATIENT MESSAGE (OUTPATIENT)
Dept: PRIMARY CARE CLINIC | Age: 42
End: 2022-12-01

## 2022-12-01 RX ORDER — ONDANSETRON 4 MG/1
4 TABLET, ORALLY DISINTEGRATING ORAL EVERY 6 HOURS PRN
Qty: 30 TABLET | Refills: 0 | Status: SHIPPED | OUTPATIENT
Start: 2022-12-01

## 2022-12-01 NOTE — TELEPHONE ENCOUNTER
Patient states she starting seeing a psych who has made some medication changes, he is weaning her off of her Xanax and started her on Ativan 2 mg along with Celexa 40mg. She states since the medication changes on Monday she has had leg weakness,clumsiness,vomiting, slurred speech and confusion. On the phone patient sounds very tired with slow,slurred speech. She did call the prescribing MD and he said these were all side effects. She would like to know your thoughts,recommendations? Please call her  Marck Lo back at [phone].

## 2022-12-01 NOTE — TELEPHONE ENCOUNTER
From: Margot Sloan  To: Dr. Tadeo Bodily: 12/1/2022 12:51 PM EST  Subject: Dr Nesha Wall cut my afternoons dose of Ativan today but I am afraid I am going ton have nausea would and dr Jerod Olivo is already out of the office would you be will to prescribe more zofran for this?  Thank you Dr. Lazarus Butts

## 2022-12-08 ENCOUNTER — TELEPHONE (OUTPATIENT)
Dept: PRIMARY CARE CLINIC | Age: 42
End: 2022-12-08

## 2022-12-08 ENCOUNTER — PATIENT MESSAGE (OUTPATIENT)
Dept: PRIMARY CARE CLINIC | Age: 42
End: 2022-12-08

## 2022-12-08 DIAGNOSIS — F33.40 DEPRESSION, MAJOR, RECURRENT, IN REMISSION (HCC): ICD-10-CM

## 2022-12-08 RX ORDER — DESVENLAFAXINE 50 MG/1
TABLET, EXTENDED RELEASE ORAL
Qty: 30 TABLET | Refills: 0 | Status: SHIPPED | OUTPATIENT
Start: 2022-12-08

## 2022-12-08 RX ORDER — DESVENLAFAXINE 100 MG/1
TABLET, EXTENDED RELEASE ORAL
Qty: 90 TABLET | Refills: 0 | OUTPATIENT
Start: 2022-12-08

## 2022-12-08 NOTE — TELEPHONE ENCOUNTER
Patient called to update her current medications. She is taking Celexa 20mg QD,XANAX PRN and Prisitiq 50 mg QD. PAtient states her new psych made a several sudden medication changes that caused severe side effects so she has resumed her previous medications. She will need a refill on her Pristiq.

## 2022-12-08 NOTE — TELEPHONE ENCOUNTER
From: Izabella Brumfield  To: Dr. Hackett Lasso: 12/8/2022 11:53 AM EST  Subject: Conversation with Jeremias Dumnot had spoken to Jazmine Ellsworth earlier today to correct the medications I was currently taking due to discrepancies with a doctor I saw at [facility]. I need a refill on the pristiq but since the last time I refilled Dr Ted Livingston had decreased me to 50mg I believe but this is an extended release pill and I was splitting them in half please advise and I wanted to let you know the situation as [facility] will be sending a request to refill the last prescription filled which was 100mg. Please let me know what will be ordered thank you.      Amy Mtz

## 2022-12-09 ENCOUNTER — TELEPHONE (OUTPATIENT)
Dept: PRIMARY CARE CLINIC | Age: 42
End: 2022-12-09

## 2022-12-09 RX ORDER — OLANZAPINE 10 MG/1
10 TABLET ORAL NIGHTLY
Qty: 30 TABLET | Refills: 0 | Status: SHIPPED | OUTPATIENT
Start: 2022-12-09

## 2022-12-09 NOTE — TELEPHONE ENCOUNTER
She said her most recent dosage is 10 MG, and she would like to send that to [facility]. She also was hoping she could get her Zofran medication increased from 4 MG to 8 MG.

## 2022-12-09 NOTE — TELEPHONE ENCOUNTER
Patient called in with complaints of nausea and vomiting. She states that it has been going on for about a week, and she has lost 12 pounds due to it. She believes it is due to getting taken off of the Olanzapine by psych. She is a refill be sent into [facility].

## 2022-12-27 ENCOUNTER — PATIENT MESSAGE (OUTPATIENT)
Dept: PRIMARY CARE CLINIC | Age: 42
End: 2022-12-27

## 2022-12-27 DIAGNOSIS — F41.1 GAD (GENERALIZED ANXIETY DISORDER): ICD-10-CM

## 2022-12-27 RX ORDER — ALPRAZOLAM 1 MG/1
1 TABLET ORAL 3 TIMES DAILY PRN
Qty: 45 TABLET | Refills: 0 | Status: SHIPPED | OUTPATIENT
Start: 2022-12-27 | End: 2023-01-11

## 2023-01-13 ENCOUNTER — OFFICE VISIT (OUTPATIENT)
Dept: PRIMARY CARE CLINIC | Age: 43
End: 2023-01-13
Payer: COMMERCIAL

## 2023-01-13 VITALS
DIASTOLIC BLOOD PRESSURE: 82 MMHG | OXYGEN SATURATION: 96 % | HEIGHT: 66 IN | RESPIRATION RATE: 18 BRPM | WEIGHT: 181.2 LBS | SYSTOLIC BLOOD PRESSURE: 126 MMHG | BODY MASS INDEX: 29.12 KG/M2 | HEART RATE: 76 BPM

## 2023-01-13 DIAGNOSIS — F33.40 DEPRESSION, MAJOR, RECURRENT, IN REMISSION (HCC): ICD-10-CM

## 2023-01-13 DIAGNOSIS — F41.1 GAD (GENERALIZED ANXIETY DISORDER): Primary | ICD-10-CM

## 2023-01-13 PROCEDURE — 99213 OFFICE O/P EST LOW 20 MIN: CPT | Performed by: FAMILY MEDICINE

## 2023-01-13 RX ORDER — HYDROXYZINE PAMOATE 50 MG/1
CAPSULE ORAL
COMMUNITY
Start: 2022-12-19

## 2023-01-13 RX ORDER — CITALOPRAM 20 MG/1
10 TABLET ORAL DAILY
COMMUNITY
Start: 2022-12-26

## 2023-01-13 RX ORDER — DESVENLAFAXINE 50 MG/1
50 TABLET, EXTENDED RELEASE ORAL DAILY
Qty: 30 TABLET | Refills: 2 | Status: SHIPPED | OUTPATIENT
Start: 2023-01-13

## 2023-01-13 RX ORDER — HYDROXYZINE PAMOATE 100 MG/1
CAPSULE ORAL
COMMUNITY
Start: 2023-01-11 | End: 2023-01-13

## 2023-01-13 RX ORDER — ALPRAZOLAM 0.5 MG/1
0.5 TABLET ORAL NIGHTLY PRN
COMMUNITY

## 2023-01-13 RX ORDER — BUSPIRONE HYDROCHLORIDE 30 MG/1
30 TABLET ORAL 2 TIMES DAILY
COMMUNITY
Start: 2023-01-11

## 2023-01-13 RX ORDER — DESVENLAFAXINE 100 MG/1
100 TABLET, EXTENDED RELEASE ORAL DAILY
Qty: 30 TABLET | Refills: 2 | Status: SHIPPED | OUTPATIENT
Start: 2023-01-13 | End: 2023-01-13

## 2023-01-13 RX ORDER — BUSPIRONE HYDROCHLORIDE 15 MG/1
TABLET ORAL
COMMUNITY
Start: 2022-12-19

## 2023-01-13 NOTE — PATIENT INSTRUCTIONS
SURVEY:    You may be receiving a survey from [employer] regarding your visit today. You may get this in the mail, through your MyChart, or in your email. Please complete the survey to enable us to provide the highest quality of care to you and your family. If you cannot score us a very good (5 Stars) on any question, please call the office to discuss how we could of made your experience exceptional.    Thank you!     Dr. Alistair Calles, SEAN Lara, RHODA Malki, [address] [place] [address]    Phone: [phone]  Fax: [phone]    Office Hours:   Bruce Ewing, [address], F: 8-5

## 2023-01-13 NOTE — PROGRESS NOTES
Anxiety and Depression: Patient is here for of evaluation of anxiety disorder. She has the following anxiety symptoms:  panic attacks in the morning . Onset of symptoms was approximately 2 months ago, unchanged since that time. She denies current suicidal and homicidal ideation. Risk factors: none Previous treatment includes BuSpar, Celexa, and Xanax . She complains of the following side effects from the treatment: none. Patient would like to discuss medication and panic attacks.       Allergies:  Prochlorperazine edisylate    Past Medical History:    Past Medical History:   Diagnosis Date    Abdominal pain     Anxiety     Carpal tunnel syndrome     Depression     Herpes zoster     Low back pain        Past Surgical History:    Past Surgical History:   Procedure Laterality Date    ABDOMINOPLASTY  2004    BREAST REDUCTION SURGERY  2003    BREAST REDUCTION SURGERY Bilateral 2003    CHOLECYSTECTOMY, LAPAROSCOPIC      with cholangiograms    GASTRIC RESTRICTION SURGERY  06/24/2020    laparoscopic gastric sleeve surgery, DR Carla Otto    HERNIA REPAIR N/A 85/32/5711    UMBILICAL HERNIA REPAIR WITH MESH       Social History:   Social History     Tobacco Use    Smoking status: Never    Smokeless tobacco: Never   Substance Use Topics    Alcohol use: Yes     Comment: socially       Family History:   Family History   Problem Relation Age of Onset    Diabetes Father     Other Mother         dvt, not hereditary     Cancer Mother         carcinoid    Other Maternal Grandmother         aortic & abdominal aneurysms    Breast Cancer Maternal Grandmother     Cancer Paternal Grandmother         brain    Cancer Paternal Grandfather         leukemia         Review of Systems:  Constitutional: negative for fevers or chills  Eyes: negative for visual disturbance   ENT: negative for sore throat or nasal congestion  Respiratory: negative for cough, shortness of breath and sputum  Cardiovascular: negative for chest pain or palpitations  Gastrointestinal: negative for abd pain, nausea, vomiting, diarrhea or constipation  Neurological: negative for unilateral weakness, numbness or tingling. Psych: has a diagnosis of depression and anxiety  Sleep-normal                                                                        Appetite-normal  Interest-normal                                                                     Suicidal Ideation-no  Guilt-no                                                                          Psycomotor Activity- increased  Energy-normal  Concentration-normal                                                           Mood -has panic episodes in am and has to take xanax. She has not been taking her inderal.  She has started going to mental health clinic in [place]  Objective:  /82 (Site: Left Upper Arm, Position: Sitting, Cuff Size: Large Adult)   Pulse 76   Resp 18   Ht 5' 6\" (1.676 m)   Wt 181 lb 3.2 oz (82.2 kg)   LMP 01/06/2023 (Approximate)   SpO2 96%   BMI 29.25 kg/m²  Body mass index is 29.25 kg/m². She is oriented. HEENT -normal  Cardiovascular: Regular rate and rhythm,no murmer or gallop  Lungs: clear to auscultation bilaterally,no wheezing & no retractions  Extremities:  No edema No calf tenderness  Neuro: Motor power normal.No sensory deficit. Coordination normal.   Psych: Alert and oriented, appropriate affect. NO SUICIDAL OR HOMICIDAL THOUHTS      Assessment:    ICD-10-CM    1. ADRIEN (generalized anxiety disorder)  F41.1       2. Depression, major, recurrent, in remission ([facility] [address]  F33.40 desvenlafaxine succinate (PRISTIQ) 100 MG TB24 extended release tablet              Plan:    ICD-10-CM    1. ADRIEN (generalized anxiety disorder) - discussed options for panic. Resume inderal at night to prevent her panic in am. Increase pristiq to 100 mg daily and monitor for side effects F41.1       2.  Depression, major, recurrent, in remission ([facility] [address]  F33.40 desvenlafaxine succinate (PRISTIQ) 100 MG TB24 extended release tablet        To follow up with psych as schedulled  No orders of the defined types were placed in this encounter. Orders Placed This Encounter   Medications    desvenlafaxine succinate (PRISTIQ) 100 MG TB24 extended release tablet     Sig: Take 1 tablet by mouth daily Take one tablet daily     Dispense:  30 tablet     Refill:  2     Return in about 3 months (around 4/13/2023).      Electronically signed by Jalil Rausch MD on 1/13/2023 at 9:52 AM

## 2023-01-15 ENCOUNTER — PATIENT MESSAGE (OUTPATIENT)
Dept: PRIMARY CARE CLINIC | Age: 43
End: 2023-01-15

## 2023-01-15 DIAGNOSIS — F41.1 GAD (GENERALIZED ANXIETY DISORDER): ICD-10-CM

## 2023-01-16 DIAGNOSIS — F41.1 GAD (GENERALIZED ANXIETY DISORDER): Primary | ICD-10-CM

## 2023-01-16 RX ORDER — ALPRAZOLAM 0.5 MG/1
0.5 TABLET ORAL DAILY PRN
Qty: 45 TABLET | Refills: 0 | Status: SHIPPED | OUTPATIENT
Start: 2023-01-16 | End: 2023-02-15

## 2023-01-16 RX ORDER — ALPRAZOLAM 1 MG/1
TABLET ORAL
Qty: 45 TABLET | Refills: 0 | OUTPATIENT
Start: 2023-01-16

## 2023-01-16 NOTE — TELEPHONE ENCOUNTER
From: Lawson Gan  To: Dr. Fay Fuellin/15/2023 4:03 PM EST  Subject: Alprazolam    I put in a request for a refill on my alprazolam but could I have it filled with just 0.5mg TID as needed since I am have been using a reduced dose? Thank you.      Grant Olivo

## 2023-01-17 ENCOUNTER — TELEPHONE (OUTPATIENT)
Dept: PRIMARY CARE CLINIC | Age: 43
End: 2023-01-17

## 2023-01-17 NOTE — TELEPHONE ENCOUNTER
Pharmacist called regarding script received yesterday for Xanax 0.5mg due to quantity not matching the directions. New order given for correct dose of Xanax 0.5mg TID PRN.

## 2023-03-28 ENCOUNTER — OFFICE VISIT (OUTPATIENT)
Dept: OBGYN | Age: 43
End: 2023-03-28
Payer: COMMERCIAL

## 2023-03-28 VITALS
DIASTOLIC BLOOD PRESSURE: 74 MMHG | HEIGHT: 67 IN | BODY MASS INDEX: 29.16 KG/M2 | SYSTOLIC BLOOD PRESSURE: 124 MMHG | WEIGHT: 185.8 LBS

## 2023-03-28 DIAGNOSIS — N92.6 IRREGULAR MENSES: ICD-10-CM

## 2023-03-28 PROCEDURE — 99213 OFFICE O/P EST LOW 20 MIN: CPT | Performed by: ADVANCED PRACTICE MIDWIFE

## 2023-03-28 RX ORDER — ALPRAZOLAM 0.25 MG/1
0.5 TABLET ORAL DAILY
COMMUNITY
Start: 2023-03-10

## 2023-03-28 RX ORDER — DESVENLAFAXINE 100 MG/1
TABLET, EXTENDED RELEASE ORAL
COMMUNITY
Start: 2023-03-23

## 2023-03-28 RX ORDER — DROSPIRENONE AND ETHINYL ESTRADIOL 0.03MG-3MG
1 KIT ORAL DAILY
Qty: 1 PACKET | Refills: 3 | Status: SHIPPED | OUTPATIENT
Start: 2023-03-28

## 2023-03-28 RX ORDER — GABAPENTIN 100 MG/1
CAPSULE ORAL
COMMUNITY
Start: 2023-03-06

## 2023-03-28 RX ORDER — DESVENLAFAXINE 25 MG/1
TABLET, EXTENDED RELEASE ORAL
COMMUNITY
Start: 2023-03-23

## 2023-03-28 NOTE — PROGRESS NOTES
reports she had been on xanax for years after her last child was born and it had been increased to 4 1/2 mg a day, she has been working with new pcp to decrease and is down to 1/2 mg a day      PT denies fever, chills, nausea and vomiting       Objective: No acute distress  Excellent communications  Well-nourished     Results reviewed today:    No results found for this visit on 03/28/23. Assessment and Plan          Diagnosis Orders   1. Irregular menses  drospirenone-ethinyl estradiol (SHARON 28) 3-0.03 MG TABS          will try adjusting ocp to montly cycling and progesterone to less androgenic, reviewed danger signs      I have discontinued Angélica NYEAlexandra Sabiha's Levonorgest-Eth Estrad 91-Day. I am also having her start on drospirenone-ethinyl estradiol. Additionally, I am having her maintain her busPIRone, VORTIoxetine HBr, OLANZapine, citalopram, ALPRAZolam, desvenlafaxine succinate, desvenlafaxine succinate, and gabapentin. Return in about 2 months (around 5/28/2023) for med check. There are no Patient Instructions on file for this visit.     Time spent 20 minutes      DIANA Avina CNM,3/28/2023 2:33 PM

## 2023-06-01 ENCOUNTER — OFFICE VISIT (OUTPATIENT)
Dept: OBGYN | Age: 43
End: 2023-06-01
Payer: COMMERCIAL

## 2023-06-01 VITALS
BODY MASS INDEX: 29.89 KG/M2 | HEIGHT: 66 IN | WEIGHT: 186 LBS | SYSTOLIC BLOOD PRESSURE: 120 MMHG | DIASTOLIC BLOOD PRESSURE: 78 MMHG

## 2023-06-01 DIAGNOSIS — N92.6 IRREGULAR MENSES: Primary | ICD-10-CM

## 2023-06-01 PROCEDURE — 99213 OFFICE O/P EST LOW 20 MIN: CPT | Performed by: ADVANCED PRACTICE MIDWIFE

## 2023-06-01 RX ORDER — LEVONORGESTREL / ETHINYL ESTRADIOL AND ETHINYL ESTRADIOL 150-30(84)
1 KIT ORAL DAILY
Qty: 91 TABLET | Refills: 3 | Status: SHIPPED | OUTPATIENT
Start: 2023-06-01

## 2023-06-01 RX ORDER — ARIPIPRAZOLE 2 MG/1
2 TABLET ORAL DAILY
COMMUNITY
Start: 2023-05-22

## 2023-06-01 RX ORDER — PREGABALIN 50 MG/1
50 CAPSULE ORAL 3 TIMES DAILY
COMMUNITY
Start: 2023-04-26

## 2023-07-17 DIAGNOSIS — Z12.31 ENCOUNTER FOR SCREENING MAMMOGRAM FOR MALIGNANT NEOPLASM OF BREAST: Primary | ICD-10-CM

## 2023-07-26 ENCOUNTER — OFFICE VISIT (OUTPATIENT)
Dept: OBGYN | Age: 43
End: 2023-07-26
Payer: COMMERCIAL

## 2023-07-26 VITALS
SYSTOLIC BLOOD PRESSURE: 118 MMHG | DIASTOLIC BLOOD PRESSURE: 66 MMHG | WEIGHT: 187.6 LBS | HEIGHT: 66 IN | BODY MASS INDEX: 30.15 KG/M2

## 2023-07-26 DIAGNOSIS — N64.4 BREAST PAIN, LEFT: Primary | ICD-10-CM

## 2023-07-26 PROCEDURE — 99213 OFFICE O/P EST LOW 20 MIN: CPT | Performed by: ADVANCED PRACTICE MIDWIFE

## 2023-07-26 NOTE — PROGRESS NOTES
PROBLEM VISIT     Date of service: 2023    Cheri Sawyer  Is a 39 y.o. , female    PT's PCP is: Cody Naylor MD     : 1980                                             Subjective:       No LMP recorded (lmp unknown). (Menstrual status: Irregular periods). OB History    Para Term  AB Living   3 3 2 1   3   SAB IAB Ectopic Molar Multiple Live Births             1      # Outcome Date GA Lbr Conrado/2nd Weight Sex Delivery Anes PTL Lv   3 Term 02/27/15 40w0d   F Vag-Spont   VINOD   2 Term  40w0d  8 lb 13 oz (3.997 kg) F Vag-Spont      1   34w0d  4 lb (1.814 kg) F Vag-Spont         Birth Comments: cerclage        Social History     Tobacco Use   Smoking Status Never   Smokeless Tobacco Never        Social History     Substance and Sexual Activity   Alcohol Use Yes    Comment: socially - very rare use       Allergies: Prochlorperazine edisylate      Current Outpatient Medications:     ARIPiprazole (ABILIFY) 2 MG tablet, Take 1 tablet by mouth daily, Disp: , Rfl:     pregabalin (LYRICA) 50 MG capsule, Take 1 capsule by mouth 3 times daily. , Disp: , Rfl:     Levonorgest-Eth Estrad -Day 0.15-0.03 &0.01 MG TABS, Take 1 tablet by mouth daily, Disp: 91 tablet, Rfl: 3    desvenlafaxine succinate (PRISTIQ) 100 MG TB24 extended release tablet, , Disp: , Rfl:     desvenlafaxine succinate (PRISTIQ) 25 MG TB24 extended release tablet, TAKE 1 TABLET BY MOUTH ONCE DAILY DO NOT CRUSH OR CHEW, Disp: , Rfl:     ALPRAZolam (XANAX) 0.25 MG tablet, 2 tablets daily.  (Patient not taking: Reported on 2023), Disp: , Rfl:     gabapentin (NEURONTIN) 100 MG capsule, TAKE 2 CAPSULES BY MOUTH THREE TIMES DAILY (Patient not taking: Reported on 2023), Disp: , Rfl:     OLANZapine (ZYPREXA) 10 MG tablet, Take 1 tablet by mouth nightly (Patient not taking: Reported on 2023), Disp: 90 tablet, Rfl: 3    busPIRone (BUSPAR) 30 MG tablet, Take 30 mg by mouth in the morning and at

## 2023-08-11 ENCOUNTER — HOSPITAL ENCOUNTER (OUTPATIENT)
Dept: ULTRASOUND IMAGING | Age: 43
End: 2023-08-11
Attending: ADVANCED PRACTICE MIDWIFE
Payer: COMMERCIAL

## 2023-08-11 ENCOUNTER — HOSPITAL ENCOUNTER (OUTPATIENT)
Dept: WOMENS IMAGING | Age: 43
End: 2023-08-11
Attending: ADVANCED PRACTICE MIDWIFE
Payer: COMMERCIAL

## 2023-08-11 DIAGNOSIS — N64.4 BREAST PAIN, LEFT: ICD-10-CM

## 2023-08-11 PROCEDURE — 76642 ULTRASOUND BREAST LIMITED: CPT

## 2023-08-11 PROCEDURE — G0279 TOMOSYNTHESIS, MAMMO: HCPCS

## 2023-10-23 ENCOUNTER — TELEPHONE (OUTPATIENT)
Dept: OBGYN | Age: 43
End: 2023-10-23

## 2023-10-23 NOTE — TELEPHONE ENCOUNTER
Patient calls with concerns, continues to have left breast pain, numbness, tingling and radiates around back. Patient had breast imaging 08/2023 however is still uncomfortable. Appointment ?  Referral ?

## 2023-11-14 ENCOUNTER — HOSPITAL ENCOUNTER (OUTPATIENT)
Age: 43
Discharge: HOME | End: 2023-11-14
Payer: COMMERCIAL

## 2023-11-14 DIAGNOSIS — Z00.00 ROUTINE GENERAL MEDICAL EXAMINATION AT A HEALTH CARE FACILITY: ICD-10-CM

## 2023-11-14 LAB
ALBUMIN SERPL-MCNC: 4.7 G/DL (ref 3.5–5.2)
ALBUMIN/GLOB SERPL: 1.6 {RATIO} (ref 1–2.5)
ALP SERPL-CCNC: 85 U/L (ref 35–104)
ALT SERPL-CCNC: 21 U/L (ref 5–33)
ANION GAP SERPL CALCULATED.3IONS-SCNC: 11 MMOL/L (ref 9–17)
AST SERPL-CCNC: 27 U/L
BILIRUB SERPL-MCNC: 0.4 MG/DL (ref 0.3–1.2)
BUN SERPL-MCNC: 12 MG/DL (ref 6–20)
BUN/CREAT SERPL: 15 (ref 9–20)
CALCIUM SERPL-MCNC: 9.8 MG/DL (ref 8.6–10.4)
CHLORIDE SERPL-SCNC: 103 MMOL/L (ref 98–107)
CHOLEST SERPL-MCNC: 157 MG/DL
CHOLESTEROL/HDL RATIO: 2.2
CO2 SERPL-SCNC: 27 MMOL/L (ref 20–31)
CREAT SERPL-MCNC: 0.8 MG/DL (ref 0.5–0.9)
ERYTHROCYTE [DISTWIDTH] IN BLOOD BY AUTOMATED COUNT: 12.7 % (ref 11.8–14.4)
GFR SERPL CREATININE-BSD FRML MDRD: >60 ML/MIN/1.73M2
GLUCOSE SERPL-MCNC: 92 MG/DL (ref 70–99)
HCT VFR BLD AUTO: 42 % (ref 36.3–47.1)
HDLC SERPL-MCNC: 73 MG/DL
HGB BLD-MCNC: 13.9 G/DL (ref 11.9–15.1)
LDLC SERPL CALC-MCNC: 71 MG/DL (ref 0–130)
MCH RBC QN AUTO: 31.9 PG (ref 25.2–33.5)
MCHC RBC AUTO-ENTMCNC: 33.1 G/DL (ref 28.4–34.8)
MCV RBC AUTO: 96.3 FL (ref 82.6–102.9)
NRBC BLD-RTO: 0 PER 100 WBC
PLATELET # BLD AUTO: 434 K/UL (ref 138–453)
PMV BLD AUTO: 9.5 FL (ref 8.1–13.5)
POTASSIUM SERPL-SCNC: 3.9 MMOL/L (ref 3.7–5.3)
PROT SERPL-MCNC: 7.6 G/DL (ref 6.4–8.3)
RBC # BLD AUTO: 4.36 M/UL (ref 3.95–5.11)
SODIUM SERPL-SCNC: 141 MMOL/L (ref 135–144)
TRIGL SERPL-MCNC: 65 MG/DL
WBC OTHER # BLD: 9.4 K/UL (ref 3.5–11.3)

## 2023-11-14 PROCEDURE — 80061 LIPID PANEL: CPT

## 2023-11-14 PROCEDURE — 80053 COMPREHEN METABOLIC PANEL: CPT

## 2023-11-14 PROCEDURE — 36415 COLL VENOUS BLD VENIPUNCTURE: CPT

## 2023-11-14 PROCEDURE — 85027 COMPLETE CBC AUTOMATED: CPT

## 2024-05-26 DIAGNOSIS — N92.6 IRREGULAR MENSES: ICD-10-CM

## 2024-05-29 RX ORDER — LEVONORGESTREL AND ETHINYL ESTRADIOL AND ETHINYL ESTRADIOL 150-30(84)
1 KIT ORAL DAILY
Qty: 91 TABLET | Refills: 0 | Status: SHIPPED | OUTPATIENT
Start: 2024-05-29

## 2024-07-01 ENCOUNTER — HOSPITAL ENCOUNTER (OUTPATIENT)
Age: 44
Discharge: HOME | End: 2024-07-01
Payer: COMMERCIAL

## 2024-07-01 LAB
25(OH)D3 SERPL-MCNC: 66.2 NG/ML (ref 30–100)
ALBUMIN SERPL-MCNC: 4.6 G/DL (ref 3.5–5.2)
ALBUMIN/GLOB SERPL: 1.5 {RATIO} (ref 1–2.5)
ALP SERPL-CCNC: 103 U/L (ref 35–104)
ALT SERPL-CCNC: 75 U/L (ref 5–33)
ANION GAP SERPL CALCULATED.3IONS-SCNC: 10 MMOL/L (ref 9–17)
AST SERPL-CCNC: 43 U/L
BILIRUB SERPL-MCNC: 0.3 MG/DL (ref 0.3–1.2)
BUN SERPL-MCNC: 11 MG/DL (ref 6–20)
BUN/CREAT SERPL: 16 (ref 9–20)
CALCIUM SERPL-MCNC: 9.4 MG/DL (ref 8.6–10.4)
CHLORIDE SERPL-SCNC: 106 MMOL/L (ref 98–107)
CHOLEST SERPL-MCNC: 145 MG/DL (ref 0–199)
CHOLESTEROL/HDL RATIO: 3
CO2 SERPL-SCNC: 28 MMOL/L (ref 20–31)
CREAT SERPL-MCNC: 0.7 MG/DL (ref 0.5–0.9)
ERYTHROCYTE [DISTWIDTH] IN BLOOD BY AUTOMATED COUNT: 12.5 % (ref 11.8–14.4)
FERRITIN SERPL-MCNC: 134 NG/ML (ref 13–150)
FOLATE SERPL-MCNC: 9.2 NG/ML (ref 4.8–24.2)
GFR, ESTIMATED: >90 ML/MIN/1.73M2
GLUCOSE SERPL-MCNC: 81 MG/DL (ref 70–99)
HCT VFR BLD AUTO: 44.8 % (ref 36.3–47.1)
HDLC SERPL-MCNC: 58 MG/DL
HGB BLD-MCNC: 14.8 G/DL (ref 11.9–15.1)
IRON SATN MFR SERPL: 35 % (ref 20–55)
IRON SERPL-MCNC: 101 UG/DL (ref 37–145)
LDLC SERPL CALC-MCNC: 69 MG/DL (ref 0–100)
LIPASE SERPL-CCNC: 58 U/L (ref 13–60)
MCH RBC QN AUTO: 31.2 PG (ref 25.2–33.5)
MCHC RBC AUTO-ENTMCNC: 33 G/DL (ref 28.4–34.8)
MCV RBC AUTO: 94.3 FL (ref 82.6–102.9)
NRBC BLD-RTO: 0 PER 100 WBC
PLATELET # BLD AUTO: 389 K/UL (ref 138–453)
PMV BLD AUTO: 10.2 FL (ref 8.1–13.5)
POTASSIUM SERPL-SCNC: 4 MMOL/L (ref 3.7–5.3)
PROT SERPL-MCNC: 7.6 G/DL (ref 6.4–8.3)
RBC # BLD AUTO: 4.75 M/UL (ref 3.95–5.11)
SODIUM SERPL-SCNC: 144 MMOL/L (ref 135–144)
T4 FREE SERPL-MCNC: 1.2 NG/DL (ref 0.92–1.68)
TIBC SERPL-MCNC: 286 UG/DL (ref 250–450)
TRIGL SERPL-MCNC: 92 MG/DL
TSH SERPL DL<=0.05 MIU/L-ACNC: 1.09 UIU/ML (ref 0.3–5)
UNSATURATED IRON BINDING CAPACITY: 185 UG/DL (ref 112–347)
VIT B12 SERPL-MCNC: 664 PG/ML (ref 232–1245)
VLDLC SERPL CALC-MCNC: 18 MG/DL
WBC OTHER # BLD: 7.1 K/UL (ref 3.5–11.3)

## 2024-07-01 PROCEDURE — 84443 ASSAY THYROID STIM HORMONE: CPT

## 2024-07-01 PROCEDURE — 82728 ASSAY OF FERRITIN: CPT

## 2024-07-01 PROCEDURE — 84439 ASSAY OF FREE THYROXINE: CPT

## 2024-07-01 PROCEDURE — 83540 ASSAY OF IRON: CPT

## 2024-07-01 PROCEDURE — 83550 IRON BINDING TEST: CPT

## 2024-07-01 PROCEDURE — 85027 COMPLETE CBC AUTOMATED: CPT

## 2024-07-01 PROCEDURE — 82306 VITAMIN D 25 HYDROXY: CPT

## 2024-07-01 PROCEDURE — 80061 LIPID PANEL: CPT

## 2024-07-01 PROCEDURE — 82746 ASSAY OF FOLIC ACID SERUM: CPT

## 2024-07-01 PROCEDURE — 80053 COMPREHEN METABOLIC PANEL: CPT

## 2024-07-01 PROCEDURE — 36415 COLL VENOUS BLD VENIPUNCTURE: CPT

## 2024-07-01 PROCEDURE — 82607 VITAMIN B-12: CPT

## 2024-07-01 PROCEDURE — 83036 HEMOGLOBIN GLYCOSYLATED A1C: CPT

## 2024-07-01 PROCEDURE — 83690 ASSAY OF LIPASE: CPT

## 2024-07-01 PROCEDURE — 83525 ASSAY OF INSULIN: CPT

## 2024-07-02 LAB
EST. AVERAGE GLUCOSE BLD GHB EST-MCNC: 91 MG/DL
HBA1C MFR BLD: 4.8 % (ref 4–6)
INSULIN REFERENCE RANGE:: NORMAL
INSULIN: 2.8 MU/L

## 2024-07-17 ENCOUNTER — HOSPITAL ENCOUNTER (OUTPATIENT)
Age: 44
Discharge: HOME | End: 2024-07-17
Payer: COMMERCIAL

## 2024-07-17 DIAGNOSIS — E28.319 PREMATURE MENOPAUSE: ICD-10-CM

## 2024-07-17 LAB
FSH SERPL-ACNC: 54.7 MIU/ML
GGT SERPL-CCNC: 19 U/L (ref 5–36)
HAV IGM SERPL QL IA: NONREACTIVE
HBV CORE IGM SERPL QL IA: NONREACTIVE
HBV SURFACE AG SERPL QL IA: NONREACTIVE
HCV AB SERPL QL IA: NONREACTIVE
INR PPP: 0.9
PARTIAL THROMBOPLASTIN TIME: 26.5 SEC (ref 26.8–34.8)
PROGEST SERPL-MCNC: 0.78 NG/ML
PROLACTIN SERPL-MCNC: 9.3 NG/ML (ref 4.79–23.3)
PROTHROMBIN TIME: 12.2 SEC (ref 11.7–14.1)
TSH SERPL DL<=0.05 MIU/L-ACNC: 1.56 UIU/ML (ref 0.3–5)

## 2024-07-17 PROCEDURE — 36415 COLL VENOUS BLD VENIPUNCTURE: CPT

## 2024-07-17 PROCEDURE — 84443 ASSAY THYROID STIM HORMONE: CPT

## 2024-07-17 PROCEDURE — 82671 ASSAY OF ESTROGENS: CPT

## 2024-07-17 PROCEDURE — 83001 ASSAY OF GONADOTROPIN (FSH): CPT

## 2024-07-17 PROCEDURE — 82977 ASSAY OF GGT: CPT

## 2024-07-17 PROCEDURE — 84144 ASSAY OF PROGESTERONE: CPT

## 2024-07-17 PROCEDURE — 85610 PROTHROMBIN TIME: CPT

## 2024-07-17 PROCEDURE — 80074 ACUTE HEPATITIS PANEL: CPT

## 2024-07-17 PROCEDURE — 84146 ASSAY OF PROLACTIN: CPT

## 2024-07-17 PROCEDURE — 85730 THROMBOPLASTIN TIME PARTIAL: CPT

## 2024-07-21 LAB
ESTRADIOL LEVEL: 7.5 PG/ML
ESTROGEN TOTAL: 43.9 PG/ML
ESTRONE SERPL-MCNC: 36.4 PG/ML

## 2024-07-22 ENCOUNTER — TELEPHONE (OUTPATIENT)
Dept: OBGYN | Age: 44
End: 2024-07-22

## 2024-07-22 DIAGNOSIS — R92.30 DENSE BREAST TISSUE: Primary | ICD-10-CM

## 2024-07-22 NOTE — TELEPHONE ENCOUNTER
Patient asking for mammogram order. Patient had diagnostic with ultrasound last year as she had breast discomfort. Was found to have cyst and dense breast but recommendation is to return to screening. Patient still has numbness and tingling mostly in left breast. Asking for order for diagnostic  mammogram and  ultrasound. Please advise.

## 2024-08-02 ENCOUNTER — APPOINTMENT (OUTPATIENT)
Dept: ULTRASOUND IMAGING | Age: 44
End: 2024-08-02
Attending: ADVANCED PRACTICE MIDWIFE
Payer: COMMERCIAL

## 2024-08-02 ENCOUNTER — HOSPITAL ENCOUNTER (OUTPATIENT)
Dept: WOMENS IMAGING | Age: 44
Discharge: HOME | End: 2024-08-02
Attending: ADVANCED PRACTICE MIDWIFE
Payer: COMMERCIAL

## 2024-08-02 VITALS — HEIGHT: 66 IN | BODY MASS INDEX: 27 KG/M2 | WEIGHT: 168 LBS

## 2024-08-02 DIAGNOSIS — R92.30 DENSE BREAST TISSUE: ICD-10-CM

## 2024-08-02 PROCEDURE — 77066 DX MAMMO INCL CAD BI: CPT

## 2024-08-07 ENCOUNTER — HOSPITAL ENCOUNTER (OUTPATIENT)
Age: 44
Discharge: HOME | End: 2024-08-07
Payer: COMMERCIAL

## 2024-08-07 LAB
ALBUMIN SERPL-MCNC: 4.7 G/DL (ref 3.5–5.2)
ALBUMIN/GLOB SERPL: 1.6 {RATIO} (ref 1–2.5)
ALP SERPL-CCNC: 94 U/L (ref 35–104)
ALT SERPL-CCNC: 28 U/L (ref 5–33)
ANION GAP SERPL CALCULATED.3IONS-SCNC: 11 MMOL/L (ref 9–17)
AST SERPL-CCNC: 31 U/L
BILIRUB SERPL-MCNC: 0.3 MG/DL (ref 0.3–1.2)
BUN SERPL-MCNC: 11 MG/DL (ref 6–20)
BUN/CREAT SERPL: 16 (ref 9–20)
CALCIUM SERPL-MCNC: 9.7 MG/DL (ref 8.6–10.4)
CHLORIDE SERPL-SCNC: 101 MMOL/L (ref 98–107)
CO2 SERPL-SCNC: 29 MMOL/L (ref 20–31)
CREAT SERPL-MCNC: 0.7 MG/DL (ref 0.5–0.9)
ESTRADIOL LEVEL: 30 PG/ML
FSH SERPL-ACNC: 60.3 MIU/ML
GFR, ESTIMATED: >90 ML/MIN/1.73M2
GLUCOSE SERPL-MCNC: 71 MG/DL (ref 70–99)
POTASSIUM SERPL-SCNC: 3.8 MMOL/L (ref 3.7–5.3)
PROT SERPL-MCNC: 7.6 G/DL (ref 6.4–8.3)
SODIUM SERPL-SCNC: 141 MMOL/L (ref 135–144)

## 2024-08-07 PROCEDURE — 82627 DEHYDROEPIANDROSTERONE: CPT

## 2024-08-07 PROCEDURE — 82670 ASSAY OF TOTAL ESTRADIOL: CPT

## 2024-08-07 PROCEDURE — 83001 ASSAY OF GONADOTROPIN (FSH): CPT

## 2024-08-07 PROCEDURE — 82166 ASSAY ANTI-MULLERIAN HORM: CPT

## 2024-08-07 PROCEDURE — 36415 COLL VENOUS BLD VENIPUNCTURE: CPT

## 2024-08-07 PROCEDURE — 80053 COMPREHEN METABOLIC PANEL: CPT

## 2024-08-08 LAB — DHEA-S SERPL-MCNC: 168 UG/DL (ref 35.4–256)

## 2024-08-10 LAB — ANTI-MULLERIAN HORMONE: <0.003 NG/ML

## 2024-10-02 ENCOUNTER — HOSPITAL ENCOUNTER (OUTPATIENT)
Age: 44
Setting detail: SPECIMEN
Discharge: HOME | End: 2024-10-02
Payer: COMMERCIAL

## 2024-10-02 DIAGNOSIS — R35.0 URINARY FREQUENCY: ICD-10-CM

## 2024-10-02 PROCEDURE — 87186 SC STD MICRODIL/AGAR DIL: CPT

## 2024-10-02 PROCEDURE — 87086 URINE CULTURE/COLONY COUNT: CPT

## 2024-10-02 PROCEDURE — 87088 URINE BACTERIA CULTURE: CPT

## 2024-10-04 LAB
MICROORGANISM SPEC CULT: ABNORMAL
SERVICE CMNT-IMP: ABNORMAL
SPECIMEN DESCRIPTION: ABNORMAL

## 2024-10-16 ENCOUNTER — OFFICE VISIT (OUTPATIENT)
Dept: OBGYN | Age: 44
End: 2024-10-16
Payer: COMMERCIAL

## 2024-10-16 VITALS
WEIGHT: 165.8 LBS | SYSTOLIC BLOOD PRESSURE: 120 MMHG | DIASTOLIC BLOOD PRESSURE: 76 MMHG | HEIGHT: 67 IN | BODY MASS INDEX: 26.02 KG/M2

## 2024-10-16 DIAGNOSIS — Z01.419 ENCOUNTER FOR ANNUAL ROUTINE GYNECOLOGICAL EXAMINATION: Primary | ICD-10-CM

## 2024-10-16 PROCEDURE — 99396 PREV VISIT EST AGE 40-64: CPT | Performed by: ADVANCED PRACTICE MIDWIFE

## 2024-10-16 ASSESSMENT — PATIENT HEALTH QUESTIONNAIRE - PHQ9
3. TROUBLE FALLING OR STAYING ASLEEP: NOT AT ALL
6. FEELING BAD ABOUT YOURSELF - OR THAT YOU ARE A FAILURE OR HAVE LET YOURSELF OR YOUR FAMILY DOWN: NOT AT ALL
8. MOVING OR SPEAKING SO SLOWLY THAT OTHER PEOPLE COULD HAVE NOTICED. OR THE OPPOSITE, BEING SO FIGETY OR RESTLESS THAT YOU HAVE BEEN MOVING AROUND A LOT MORE THAN USUAL: NOT AT ALL
SUM OF ALL RESPONSES TO PHQ QUESTIONS 1-9: 0
4. FEELING TIRED OR HAVING LITTLE ENERGY: NOT AT ALL
9. THOUGHTS THAT YOU WOULD BE BETTER OFF DEAD, OR OF HURTING YOURSELF: NOT AT ALL
2. FEELING DOWN, DEPRESSED OR HOPELESS: NOT AT ALL
SUM OF ALL RESPONSES TO PHQ QUESTIONS 1-9: 0
SUM OF ALL RESPONSES TO PHQ9 QUESTIONS 1 & 2: 0
1. LITTLE INTEREST OR PLEASURE IN DOING THINGS: NOT AT ALL
10. IF YOU CHECKED OFF ANY PROBLEMS, HOW DIFFICULT HAVE THESE PROBLEMS MADE IT FOR YOU TO DO YOUR WORK, TAKE CARE OF THINGS AT HOME, OR GET ALONG WITH OTHER PEOPLE: NOT DIFFICULT AT ALL
SUM OF ALL RESPONSES TO PHQ QUESTIONS 1-9: 0
7. TROUBLE CONCENTRATING ON THINGS, SUCH AS READING THE NEWSPAPER OR WATCHING TELEVISION: NOT AT ALL
5. POOR APPETITE OR OVEREATING: NOT AT ALL
SUM OF ALL RESPONSES TO PHQ QUESTIONS 1-9: 0

## 2024-10-16 NOTE — PROGRESS NOTES
this visit on 10/16/24.    PHQ-9 Total Score: 0 (10/16/2024  2:59 PM)  Thoughts that you would be better off dead, or of hurting yourself in some way: 0 (10/16/2024  2:59 PM)      NURSE: Krystal ESTRADA    HPI: here for annual gyn exam, reports was tested one week of ocps and told she was menopausal and then started on combination HRT with estrogen patches and prometrium. Is doing well on this combination    Review of Systems   Constitutional: Negative.    HENT:  Negative for congestion.    Respiratory:  Negative for shortness of breath.    Cardiovascular:  Negative for chest pain.   Gastrointestinal:  Negative for abdominal pain.   Genitourinary:  Negative for dysuria.   Musculoskeletal:  Negative for back pain.   Skin:  Negative for rash.   Neurological:  Negative for headaches.   Psychiatric/Behavioral:  The patient is not nervous/anxious.          Objective  Lymphatic:   no lymphadenopathy  Heent:   negative   Cor: regular rate and rhythm, no murmurs              Pul:clear to auscultation bilaterally- no wheezes, rales or rhonchi, normal air movement, no respiratory distress      GI: Abdomen soft, non-tender. BS normal. No masses,  No organomegaly           Extremities: normal strength, tone, and muscle mass   Breasts: Breast:normal appearance, no masses or tenderness   Pelvic Exam: GENITAL/URINARY:  External Genitalia:  General appearance; normal, Hair distribution; normal, Lesions absent  Urethral Meatus:  Size normal, Location normal, Lesions absent, Prolapse absent  Urethra:  Fullness absent, Masses absent  Bladder:  Fullness absent, Masses absent, Tenderness absent, Cystocele absent  Vagina:  General appearance normal, Estrogen effect normal, Discharge absent, Lesions absent, Pelvic support normal  Cervix:  General appearance normal, Lesions absent, Discharge absent, Tenderness absent, Enlargement absent, Nodularity absent  Uterus:  Size normal, Tenderness absent  Adenexa:  Masses absent, Tenderness

## 2024-10-18 ASSESSMENT — ENCOUNTER SYMPTOMS
SHORTNESS OF BREATH: 0
ABDOMINAL PAIN: 0
BACK PAIN: 0

## 2024-11-25 ENCOUNTER — HOSPITAL ENCOUNTER (OUTPATIENT)
Age: 44
Discharge: HOME | End: 2024-11-25
Payer: COMMERCIAL

## 2024-11-25 LAB
ALBUMIN SERPL-MCNC: 4.5 G/DL (ref 3.5–5.2)
ALBUMIN/GLOB SERPL: 1.6 {RATIO} (ref 1–2.5)
ALP SERPL-CCNC: 115 U/L (ref 35–104)
ALT SERPL-CCNC: 21 U/L (ref 10–35)
ANION GAP SERPL CALCULATED.3IONS-SCNC: 9 MMOL/L (ref 9–16)
AST SERPL-CCNC: 25 U/L (ref 10–35)
BILIRUB SERPL-MCNC: <0.2 MG/DL (ref 0–1.2)
BUN SERPL-MCNC: 16 MG/DL (ref 6–20)
BUN/CREAT SERPL: 20 (ref 9–20)
CALCIUM SERPL-MCNC: 9.9 MG/DL (ref 8.6–10.4)
CHLORIDE SERPL-SCNC: 103 MMOL/L (ref 98–107)
CHOLEST SERPL-MCNC: 193 MG/DL (ref 0–199)
CHOLESTEROL/HDL RATIO: 3.5
CO2 SERPL-SCNC: 28 MMOL/L (ref 20–31)
CREAT SERPL-MCNC: 0.8 MG/DL (ref 0.5–0.9)
ERYTHROCYTE [DISTWIDTH] IN BLOOD BY AUTOMATED COUNT: 12.9 % (ref 11.8–14.4)
EST. AVERAGE GLUCOSE BLD GHB EST-MCNC: 97 MG/DL
ESTRADIOL LEVEL: 17 PG/ML
GFR, ESTIMATED: >90 ML/MIN/1.73M2
GLUCOSE SERPL-MCNC: 81 MG/DL (ref 74–99)
HBA1C MFR BLD: 5 % (ref 4–6)
HCT VFR BLD AUTO: 41.6 % (ref 36.3–47.1)
HDLC SERPL-MCNC: 55 MG/DL
HGB BLD-MCNC: 13.9 G/DL (ref 11.9–15.1)
LDLC SERPL CALC-MCNC: 110 MG/DL (ref 0–100)
LIPASE SERPL-CCNC: 75 U/L (ref 13–60)
MCH RBC QN AUTO: 31.1 PG (ref 25.2–33.5)
MCHC RBC AUTO-ENTMCNC: 33.4 G/DL (ref 28.4–34.8)
MCV RBC AUTO: 93.1 FL (ref 82.6–102.9)
NRBC BLD-RTO: 0 PER 100 WBC
PLATELET # BLD AUTO: 322 K/UL (ref 138–453)
PMV BLD AUTO: 9.8 FL (ref 8.1–13.5)
POTASSIUM SERPL-SCNC: 4.1 MMOL/L (ref 3.7–5.3)
PROT SERPL-MCNC: 7.4 G/DL (ref 6.6–8.7)
RBC # BLD AUTO: 4.47 M/UL (ref 3.95–5.11)
SODIUM SERPL-SCNC: 140 MMOL/L (ref 136–145)
TRIGL SERPL-MCNC: 138 MG/DL
VLDLC SERPL CALC-MCNC: 28 MG/DL (ref 1–30)
WBC OTHER # BLD: 7.5 K/UL (ref 3.5–11.3)

## 2024-11-25 PROCEDURE — 36415 COLL VENOUS BLD VENIPUNCTURE: CPT

## 2024-11-25 PROCEDURE — 82670 ASSAY OF TOTAL ESTRADIOL: CPT

## 2024-11-25 PROCEDURE — 80053 COMPREHEN METABOLIC PANEL: CPT

## 2024-11-25 PROCEDURE — 85027 COMPLETE CBC AUTOMATED: CPT

## 2024-11-25 PROCEDURE — 83036 HEMOGLOBIN GLYCOSYLATED A1C: CPT

## 2024-11-25 PROCEDURE — 80061 LIPID PANEL: CPT

## 2024-11-25 PROCEDURE — 83690 ASSAY OF LIPASE: CPT

## 2024-11-26 ENCOUNTER — TRANSCRIBE ORDERS (OUTPATIENT)
Dept: ADMINISTRATIVE | Age: 44
End: 2024-11-26

## 2024-11-26 DIAGNOSIS — R74.8 ABNORMAL SERUM LEVEL OF LIPASE: Primary | ICD-10-CM

## 2024-11-27 ENCOUNTER — HOSPITAL ENCOUNTER (OUTPATIENT)
Dept: ULTRASOUND IMAGING | Age: 44
Discharge: HOME | End: 2024-11-29
Payer: COMMERCIAL

## 2024-11-27 ENCOUNTER — HOSPITAL ENCOUNTER (OUTPATIENT)
Age: 44
Discharge: HOME | End: 2024-11-27
Payer: COMMERCIAL

## 2024-11-27 DIAGNOSIS — R74.8 ABNORMAL SERUM LEVEL OF LIPASE: ICD-10-CM

## 2024-11-27 DIAGNOSIS — Z02.1 PRE-EMPLOYMENT HEALTH SCREENING EXAMINATION: ICD-10-CM

## 2024-11-27 LAB
AMYLASE SERPL-CCNC: 97 U/L (ref 28–100)
GGT SERPL-CCNC: 19 U/L (ref 5–36)
LIPASE SERPL-CCNC: 51 U/L (ref 13–60)
RUBV IGG SERPL QL IA: >500 IU/ML

## 2024-11-27 PROCEDURE — 86787 VARICELLA-ZOSTER ANTIBODY: CPT

## 2024-11-27 PROCEDURE — 86762 RUBELLA ANTIBODY: CPT

## 2024-11-27 PROCEDURE — 82150 ASSAY OF AMYLASE: CPT

## 2024-11-27 PROCEDURE — 76700 US EXAM ABDOM COMPLETE: CPT

## 2024-11-27 PROCEDURE — 36415 COLL VENOUS BLD VENIPUNCTURE: CPT

## 2024-11-27 PROCEDURE — 86735 MUMPS ANTIBODY: CPT

## 2024-11-27 PROCEDURE — 82977 ASSAY OF GGT: CPT

## 2024-11-27 PROCEDURE — 83690 ASSAY OF LIPASE: CPT

## 2024-11-29 LAB
MUV IGG SER IA-ACNC: 2.05
VZV IGG SER QL IA: 2.72

## 2024-12-26 ENCOUNTER — HOSPITAL ENCOUNTER (OUTPATIENT)
Age: 44
Discharge: HOME | End: 2024-12-26
Payer: COMMERCIAL

## 2024-12-26 DIAGNOSIS — Z11.1 SCREENING FOR TUBERCULOSIS: ICD-10-CM

## 2024-12-26 DIAGNOSIS — Z02.1 PRE-EMPLOYMENT HEALTH SCREENING EXAMINATION: ICD-10-CM

## 2024-12-26 PROCEDURE — 86480 TB TEST CELL IMMUN MEASURE: CPT

## 2024-12-26 PROCEDURE — 86765 RUBEOLA ANTIBODY: CPT

## 2024-12-27 LAB — MEV IGG SER-ACNC: 2.7

## 2024-12-30 LAB
QUANTI TB GOLD PLUS: POSITIVE
QUANTI TB1 MINUS NIL: 0.87 IU/ML
QUANTI TB2 MINUS NIL: 1.54 IU/ML
QUANTIFERON MITOGEN: 9.85 IU/ML
QUANTIFERON NIL: 0.02 IU/ML

## 2025-04-07 ENCOUNTER — PATIENT MESSAGE (OUTPATIENT)
Dept: OBGYN | Age: 45
End: 2025-04-07

## 2025-05-22 ENCOUNTER — HOSPITAL ENCOUNTER (OUTPATIENT)
Age: 45
Discharge: HOME | End: 2025-05-22
Payer: COMMERCIAL

## 2025-05-22 LAB
ALBUMIN SERPL-MCNC: 4.6 G/DL (ref 3.5–5.2)
ALBUMIN/GLOB SERPL: 1.6 {RATIO} (ref 1–2.5)
ALP SERPL-CCNC: 91 U/L (ref 35–104)
ALT SERPL-CCNC: 18 U/L (ref 10–35)
ANION GAP SERPL CALCULATED.3IONS-SCNC: 13 MMOL/L (ref 9–16)
AST SERPL-CCNC: 27 U/L (ref 10–35)
BILIRUB SERPL-MCNC: <0.2 MG/DL (ref 0–1.2)
BUN SERPL-MCNC: 16 MG/DL (ref 6–20)
BUN/CREAT SERPL: 20 (ref 9–20)
CALCIUM SERPL-MCNC: 10.1 MG/DL (ref 8.6–10.4)
CHLORIDE SERPL-SCNC: 103 MMOL/L (ref 98–107)
CO2 SERPL-SCNC: 27 MMOL/L (ref 20–31)
CREAT SERPL-MCNC: 0.8 MG/DL (ref 0.5–0.9)
ERYTHROCYTE [DISTWIDTH] IN BLOOD BY AUTOMATED COUNT: 13.1 % (ref 11.8–14.4)
ERYTHROCYTE [SEDIMENTATION RATE] IN BLOOD BY PHOTOMETRIC METHOD: 3 MM/HR (ref 0–20)
ESTRADIOL LEVEL: 36.3 PG/ML
GFR, ESTIMATED: >90 ML/MIN/1.73M2
GLUCOSE SERPL-MCNC: 85 MG/DL (ref 74–99)
HCT VFR BLD AUTO: 38.2 % (ref 36.3–47.1)
HGB BLD-MCNC: 12.4 G/DL (ref 11.9–15.1)
LDH SERPL-CCNC: 149 U/L (ref 135–214)
MCH RBC QN AUTO: 30.8 PG (ref 25.2–33.5)
MCHC RBC AUTO-ENTMCNC: 32.5 G/DL (ref 28.4–34.8)
MCV RBC AUTO: 94.8 FL (ref 82.6–102.9)
NRBC BLD-RTO: 0 PER 100 WBC
PLATELET # BLD AUTO: 339 K/UL (ref 138–453)
PMV BLD AUTO: 10.1 FL (ref 8.1–13.5)
POTASSIUM SERPL-SCNC: 3.9 MMOL/L (ref 3.7–5.3)
PROT SERPL-MCNC: 7.4 G/DL (ref 6.6–8.7)
RBC # BLD AUTO: 4.03 M/UL (ref 3.95–5.11)
SODIUM SERPL-SCNC: 143 MMOL/L (ref 136–145)
T4 FREE SERPL-MCNC: 0.9 NG/DL (ref 0.92–1.68)
TSH SERPL DL<=0.05 MIU/L-ACNC: 1.04 UIU/ML (ref 0.27–4.2)
WBC OTHER # BLD: 6.8 K/UL (ref 3.5–11.3)

## 2025-05-22 PROCEDURE — 85652 RBC SED RATE AUTOMATED: CPT

## 2025-05-22 PROCEDURE — 83615 LACTATE (LD) (LDH) ENZYME: CPT

## 2025-05-22 PROCEDURE — 36415 COLL VENOUS BLD VENIPUNCTURE: CPT

## 2025-05-22 PROCEDURE — 80053 COMPREHEN METABOLIC PANEL: CPT

## 2025-05-22 PROCEDURE — 82670 ASSAY OF TOTAL ESTRADIOL: CPT

## 2025-05-22 PROCEDURE — 84439 ASSAY OF FREE THYROXINE: CPT

## 2025-05-22 PROCEDURE — 84630 ASSAY OF ZINC: CPT

## 2025-05-22 PROCEDURE — 84443 ASSAY THYROID STIM HORMONE: CPT

## 2025-05-22 PROCEDURE — 85027 COMPLETE CBC AUTOMATED: CPT

## 2025-05-24 LAB — ZINC SERPL-MCNC: 86 UG/DL (ref 60–120)

## 2025-06-09 ENCOUNTER — HOSPITAL ENCOUNTER (OUTPATIENT)
Age: 45
Discharge: HOME | End: 2025-06-09
Payer: COMMERCIAL

## 2025-06-09 DIAGNOSIS — R74.8 ELEVATED LIPASE: ICD-10-CM

## 2025-06-09 DIAGNOSIS — E78.5 HYPERLIPIDEMIA, UNSPECIFIED HYPERLIPIDEMIA TYPE: ICD-10-CM

## 2025-06-09 DIAGNOSIS — R73.01 IMPAIRED FASTING GLUCOSE: ICD-10-CM

## 2025-06-09 LAB
ALBUMIN SERPL-MCNC: 4.7 G/DL (ref 3.5–5.2)
ALBUMIN/GLOB SERPL: 1.6 {RATIO} (ref 1–2.5)
ALP SERPL-CCNC: 77 U/L (ref 35–104)
ALT SERPL-CCNC: 15 U/L (ref 10–35)
AMYLASE SERPL-CCNC: 88 U/L (ref 28–100)
ANION GAP SERPL CALCULATED.3IONS-SCNC: 12 MMOL/L (ref 9–16)
AST SERPL-CCNC: 23 U/L (ref 10–35)
BILIRUB SERPL-MCNC: 0.3 MG/DL (ref 0–1.2)
BUN SERPL-MCNC: 14 MG/DL (ref 6–20)
BUN/CREAT SERPL: 16 (ref 9–20)
CALCIUM SERPL-MCNC: 10 MG/DL (ref 8.6–10.4)
CHLORIDE SERPL-SCNC: 104 MMOL/L (ref 98–107)
CO2 SERPL-SCNC: 24 MMOL/L (ref 20–31)
CREAT SERPL-MCNC: 0.9 MG/DL (ref 0.5–0.9)
ERYTHROCYTE [DISTWIDTH] IN BLOOD BY AUTOMATED COUNT: 12.8 % (ref 11.8–14.4)
EST. AVERAGE GLUCOSE BLD GHB EST-MCNC: 97 MG/DL
GFR, ESTIMATED: 76 ML/MIN/1.73M2
GGT SERPL-CCNC: 14 U/L (ref 5–36)
GLUCOSE SERPL-MCNC: 90 MG/DL (ref 74–99)
HBA1C MFR BLD: 5 % (ref 4–6)
HCT VFR BLD AUTO: 40.1 % (ref 36.3–47.1)
HGB BLD-MCNC: 13.2 G/DL (ref 11.9–15.1)
LIPASE SERPL-CCNC: 44 U/L (ref 13–60)
MCH RBC QN AUTO: 30.5 PG (ref 25.2–33.5)
MCHC RBC AUTO-ENTMCNC: 32.9 G/DL (ref 28.4–34.8)
MCV RBC AUTO: 92.6 FL (ref 82.6–102.9)
NRBC BLD-RTO: 0 PER 100 WBC
PLATELET # BLD AUTO: 346 K/UL (ref 138–453)
PMV BLD AUTO: 9.4 FL (ref 8.1–13.5)
POTASSIUM SERPL-SCNC: 4.1 MMOL/L (ref 3.7–5.3)
PROT SERPL-MCNC: 7.6 G/DL (ref 6.6–8.7)
RBC # BLD AUTO: 4.33 M/UL (ref 3.95–5.11)
SODIUM SERPL-SCNC: 140 MMOL/L (ref 136–145)
WBC OTHER # BLD: 6.5 K/UL (ref 3.5–11.3)

## 2025-06-09 PROCEDURE — 83690 ASSAY OF LIPASE: CPT

## 2025-06-09 PROCEDURE — 85027 COMPLETE CBC AUTOMATED: CPT

## 2025-06-09 PROCEDURE — 82150 ASSAY OF AMYLASE: CPT

## 2025-06-09 PROCEDURE — 36415 COLL VENOUS BLD VENIPUNCTURE: CPT

## 2025-06-09 PROCEDURE — 82977 ASSAY OF GGT: CPT

## 2025-06-09 PROCEDURE — 83036 HEMOGLOBIN GLYCOSYLATED A1C: CPT

## 2025-06-09 PROCEDURE — 80053 COMPREHEN METABOLIC PANEL: CPT

## 2025-08-28 ENCOUNTER — TRANSCRIBE ORDERS (OUTPATIENT)
Dept: ADMINISTRATIVE | Age: 45
End: 2025-08-28

## 2025-08-28 DIAGNOSIS — M50.30 DEGENERATION OF CERVICAL INTERVERTEBRAL DISC: Primary | ICD-10-CM
